# Patient Record
Sex: FEMALE | Race: WHITE | NOT HISPANIC OR LATINO | Employment: FULL TIME | URBAN - METROPOLITAN AREA
[De-identification: names, ages, dates, MRNs, and addresses within clinical notes are randomized per-mention and may not be internally consistent; named-entity substitution may affect disease eponyms.]

---

## 2018-01-17 ENCOUNTER — CONVERSION ENCOUNTER (OUTPATIENT)
Dept: GENERAL RADIOLOGY | Facility: HOSPITAL | Age: 43
End: 2018-01-17

## 2018-01-19 ENCOUNTER — CONVERSION ENCOUNTER (OUTPATIENT)
Dept: MAMMOGRAPHY | Facility: HOSPITAL | Age: 43
End: 2018-01-19

## 2018-10-26 ENCOUNTER — OFFICE VISIT CONVERTED (OUTPATIENT)
Dept: FAMILY MEDICINE CLINIC | Facility: CLINIC | Age: 43
End: 2018-10-26
Attending: PHYSICIAN ASSISTANT

## 2018-11-01 ENCOUNTER — CONVERSION ENCOUNTER (OUTPATIENT)
Dept: MAMMOGRAPHY | Facility: HOSPITAL | Age: 43
End: 2018-11-01

## 2019-01-09 ENCOUNTER — OFFICE VISIT CONVERTED (OUTPATIENT)
Dept: NEUROLOGY | Facility: CLINIC | Age: 44
End: 2019-01-09
Attending: PSYCHIATRY & NEUROLOGY

## 2019-01-11 ENCOUNTER — HOSPITAL ENCOUNTER (OUTPATIENT)
Dept: OTHER | Facility: HOSPITAL | Age: 44
Discharge: HOME | End: 2019-01-11

## 2019-01-11 LAB
ANION GAP SERPL CALC-SCNC: 16 MMOL/L (ref 8–19)
BUN SERPL-MCNC: 12 MG/DL (ref 5–25)
BUN/CREAT SERPL: 11 {RATIO} (ref 6–20)
CALCIUM SERPL-MCNC: 9.2 MG/DL (ref 8.7–10.4)
CHLORIDE SERPL-SCNC: 106 MMOL/L (ref 99–111)
CONV CO2: 24 MMOL/L (ref 22–32)
CREAT UR-MCNC: 1.06 MG/DL (ref 0.5–0.9)
CRP SERPL-MCNC: 1.9 MG/L (ref 0–5)
ERYTHROCYTE [SEDIMENTATION RATE] IN BLOOD: 2 MM/H (ref 0–20)
GFR SERPLBLD BASED ON 1.73 SQ M-ARVRAT: >60 ML/MIN/{1.73_M2}
GLUCOSE SERPL-MCNC: 112 MG/DL (ref 65–99)
OSMOLALITY SERPL CALC.SUM OF ELEC: 295 MOSM/KG (ref 273–304)
POTASSIUM SERPL-SCNC: 3.7 MMOL/L (ref 3.5–5.3)
SODIUM SERPL-SCNC: 142 MMOL/L (ref 135–147)

## 2019-01-18 ENCOUNTER — HOSPITAL ENCOUNTER (OUTPATIENT)
Dept: GENERAL RADIOLOGY | Facility: HOSPITAL | Age: 44
Discharge: HOME | End: 2019-01-18

## 2019-01-22 ENCOUNTER — OFFICE VISIT CONVERTED (OUTPATIENT)
Dept: FAMILY MEDICINE CLINIC | Facility: CLINIC | Age: 44
End: 2019-01-22
Attending: PHYSICIAN ASSISTANT

## 2019-01-23 ENCOUNTER — HOSPITAL ENCOUNTER (OUTPATIENT)
Dept: GENERAL RADIOLOGY | Facility: HOSPITAL | Age: 44
Discharge: HOME | End: 2019-01-23
Attending: NURSE PRACTITIONER

## 2019-04-23 ENCOUNTER — CONVERSION ENCOUNTER (OUTPATIENT)
Dept: FAMILY MEDICINE CLINIC | Facility: CLINIC | Age: 44
End: 2019-04-23

## 2019-04-23 ENCOUNTER — OFFICE VISIT CONVERTED (OUTPATIENT)
Dept: FAMILY MEDICINE CLINIC | Facility: CLINIC | Age: 44
End: 2019-04-23
Attending: PHYSICIAN ASSISTANT

## 2019-05-29 ENCOUNTER — OFFICE VISIT CONVERTED (OUTPATIENT)
Dept: NEUROLOGY | Facility: CLINIC | Age: 44
End: 2019-05-29
Attending: PSYCHIATRY & NEUROLOGY

## 2019-06-04 ENCOUNTER — HOSPITAL ENCOUNTER (OUTPATIENT)
Dept: CARDIOLOGY | Facility: HOSPITAL | Age: 44
Discharge: HOME | End: 2019-06-04

## 2019-12-12 ENCOUNTER — OFFICE VISIT CONVERTED (OUTPATIENT)
Dept: FAMILY MEDICINE CLINIC | Facility: CLINIC | Age: 44
End: 2019-12-12
Attending: PHYSICIAN ASSISTANT

## 2020-01-29 ENCOUNTER — HOSPITAL ENCOUNTER (OUTPATIENT)
Dept: GENERAL RADIOLOGY | Facility: HOSPITAL | Age: 45
Discharge: HOME | End: 2020-01-29
Attending: PHYSICIAN ASSISTANT

## 2020-01-29 ENCOUNTER — HOSPITAL ENCOUNTER (OUTPATIENT)
Dept: LAB | Facility: HOSPITAL | Age: 45
Discharge: HOME | End: 2020-01-29

## 2020-05-08 ENCOUNTER — HOSPITAL ENCOUNTER (OUTPATIENT)
Dept: OTHER | Facility: HOSPITAL | Age: 45
Discharge: HOME | End: 2020-05-08
Attending: NURSE PRACTITIONER

## 2020-05-08 LAB
ALBUMIN SERPL-MCNC: 4.3 G/DL (ref 3.5–5)
ALBUMIN/GLOB SERPL: 1.5 {RATIO} (ref 1.4–2.6)
ALP SERPL-CCNC: 76 U/L (ref 42–98)
ALT SERPL-CCNC: 10 U/L (ref 10–40)
ANION GAP SERPL CALC-SCNC: 17 MMOL/L (ref 8–19)
AST SERPL-CCNC: 14 U/L (ref 15–50)
BASOPHILS # BLD AUTO: 0.05 10*3/UL (ref 0–0.2)
BASOPHILS NFR BLD AUTO: 0.8 % (ref 0–3)
BILIRUB SERPL-MCNC: 0.42 MG/DL (ref 0.2–1.3)
BUN SERPL-MCNC: 11 MG/DL (ref 5–25)
BUN/CREAT SERPL: 10 {RATIO} (ref 6–20)
CALCIUM SERPL-MCNC: 9.1 MG/DL (ref 8.7–10.4)
CHLORIDE SERPL-SCNC: 105 MMOL/L (ref 99–111)
CONV ABS IMM GRAN: 0.03 10*3/UL (ref 0–0.2)
CONV CO2: 22 MMOL/L (ref 22–32)
CONV IMMATURE GRAN: 0.5 % (ref 0–1.8)
CONV TOTAL PROTEIN: 7.2 G/DL (ref 6.3–8.2)
CREAT UR-MCNC: 1.07 MG/DL (ref 0.5–0.9)
DEPRECATED RDW RBC AUTO: 46.5 FL (ref 36.4–46.3)
EOSINOPHIL # BLD AUTO: 0.49 10*3/UL (ref 0–0.7)
EOSINOPHIL # BLD AUTO: 8.3 % (ref 0–7)
ERYTHROCYTE [DISTWIDTH] IN BLOOD BY AUTOMATED COUNT: 12.5 % (ref 11.7–14.4)
EST. AVERAGE GLUCOSE BLD GHB EST-MCNC: 103 MG/DL
GFR SERPLBLD BASED ON 1.73 SQ M-ARVRAT: >60 ML/MIN/{1.73_M2}
GLOBULIN UR ELPH-MCNC: 2.9 G/DL (ref 2–3.5)
GLUCOSE SERPL-MCNC: 92 MG/DL (ref 65–99)
HBA1C MFR BLD: 5.2 % (ref 3.5–5.7)
HCT VFR BLD AUTO: 47.6 % (ref 37–47)
HGB BLD-MCNC: 15.2 G/DL (ref 12–16)
LYMPHOCYTES # BLD AUTO: 1.83 10*3/UL (ref 1–5)
LYMPHOCYTES NFR BLD AUTO: 30.9 % (ref 20–45)
MCH RBC QN AUTO: 32 PG (ref 27–31)
MCHC RBC AUTO-ENTMCNC: 31.9 G/DL (ref 33–37)
MCV RBC AUTO: 100.2 FL (ref 81–99)
MONOCYTES # BLD AUTO: 0.65 10*3/UL (ref 0.2–1.2)
MONOCYTES NFR BLD AUTO: 11 % (ref 3–10)
NEUTROPHILS # BLD AUTO: 2.87 10*3/UL (ref 2–8)
NEUTROPHILS NFR BLD AUTO: 48.5 % (ref 30–85)
NRBC CBCN: 0 % (ref 0–0.7)
OSMOLALITY SERPL CALC.SUM OF ELEC: 289 MOSM/KG (ref 273–304)
PLATELET # BLD AUTO: 260 10*3/UL (ref 130–400)
PMV BLD AUTO: 9.9 FL (ref 9.4–12.3)
POTASSIUM SERPL-SCNC: 3.8 MMOL/L (ref 3.5–5.3)
RBC # BLD AUTO: 4.75 10*6/UL (ref 4.2–5.4)
SODIUM SERPL-SCNC: 140 MMOL/L (ref 135–147)
T4 FREE SERPL-MCNC: 1.3 NG/DL (ref 0.9–1.8)
TSH SERPL-ACNC: 3.26 M[IU]/L (ref 0.27–4.2)
WBC # BLD AUTO: 5.92 10*3/UL (ref 4.8–10.8)

## 2020-05-09 LAB
25(OH)D3 SERPL-MCNC: 18 NG/ML (ref 30–100)
IRON SATN MFR SERPL: 49 % (ref 20–55)
IRON SERPL-MCNC: 137 UG/DL (ref 60–170)
TIBC SERPL-MCNC: 277 UG/DL (ref 245–450)
TRANSFERRIN SERPL-MCNC: 194 MG/DL (ref 250–380)

## 2020-05-21 ENCOUNTER — HOSPITAL ENCOUNTER (OUTPATIENT)
Dept: FAMILY MEDICINE CLINIC | Facility: CLINIC | Age: 45
Discharge: HOME | End: 2020-05-21
Attending: PHYSICIAN ASSISTANT

## 2020-05-21 LAB
FOLATE SERPL-MCNC: 9.7 NG/ML (ref 4.8–20)
VIT B12 SERPL-MCNC: 314 PG/ML (ref 211–911)

## 2020-07-28 ENCOUNTER — TELEMEDICINE CONVERTED (OUTPATIENT)
Dept: FAMILY MEDICINE CLINIC | Facility: CLINIC | Age: 45
End: 2020-07-28
Attending: PHYSICIAN ASSISTANT

## 2020-10-26 ENCOUNTER — HOSPITAL ENCOUNTER (OUTPATIENT)
Dept: OTHER | Facility: HOSPITAL | Age: 45
Discharge: HOME | End: 2020-10-26
Attending: NURSE PRACTITIONER

## 2020-10-26 LAB
ALBUMIN SERPL-MCNC: 4.2 G/DL (ref 3.5–5)
ALP SERPL-CCNC: 99 U/L (ref 42–98)
ALT SERPL-CCNC: 70 U/L (ref 10–40)
AMYLASE SERPL-CCNC: 56 U/L (ref 30–110)
AST SERPL-CCNC: 119 U/L (ref 15–50)
BASOPHILS # BLD AUTO: 0.05 10*3/UL (ref 0–0.2)
BASOPHILS NFR BLD AUTO: 0.6 % (ref 0–3)
BILIRUB SERPL-MCNC: 0.7 MG/DL (ref 0.2–1.3)
CONV ABS IMM GRAN: 0.02 10*3/UL (ref 0–0.2)
CONV BILI, CONJUGATED: <0.2 MG/DL (ref 0–0.6)
CONV IMMATURE GRAN: 0.3 % (ref 0–1.8)
CONV TOTAL PROTEIN: 7 G/DL (ref 6.3–8.2)
CONV UNCONJUGATED BILIRUBIN: 0.5 MG/DL (ref 0–1.1)
DEPRECATED RDW RBC AUTO: 43.2 FL (ref 36.4–46.3)
EOSINOPHIL # BLD AUTO: 0.71 10*3/UL (ref 0–0.7)
EOSINOPHIL # BLD AUTO: 9.1 % (ref 0–7)
ERYTHROCYTE [DISTWIDTH] IN BLOOD BY AUTOMATED COUNT: 11.9 % (ref 11.7–14.4)
HCT VFR BLD AUTO: 49.7 % (ref 37–47)
HGB BLD-MCNC: 16.3 G/DL (ref 12–16)
LIPASE SERPL-CCNC: 31 U/L (ref 5–51)
LYMPHOCYTES # BLD AUTO: 1.73 10*3/UL (ref 1–5)
LYMPHOCYTES NFR BLD AUTO: 22.2 % (ref 20–45)
MCH RBC QN AUTO: 32 PG (ref 27–31)
MCHC RBC AUTO-ENTMCNC: 32.8 G/DL (ref 33–37)
MCV RBC AUTO: 97.6 FL (ref 81–99)
MONOCYTES # BLD AUTO: 0.66 10*3/UL (ref 0.2–1.2)
MONOCYTES NFR BLD AUTO: 8.5 % (ref 3–10)
NEUTROPHILS # BLD AUTO: 4.62 10*3/UL (ref 2–8)
NEUTROPHILS NFR BLD AUTO: 59.3 % (ref 30–85)
NRBC CBCN: 0 % (ref 0–0.7)
PLATELET # BLD AUTO: 276 10*3/UL (ref 130–400)
PMV BLD AUTO: 9.6 FL (ref 9.4–12.3)
RBC # BLD AUTO: 5.09 10*6/UL (ref 4.2–5.4)
T4 FREE SERPL-MCNC: 1.1 NG/DL (ref 0.9–1.8)
TSH SERPL-ACNC: 3.12 M[IU]/L (ref 0.27–4.2)
WBC # BLD AUTO: 7.79 10*3/UL (ref 4.8–10.8)

## 2020-12-29 ENCOUNTER — HOSPITAL ENCOUNTER (OUTPATIENT)
Dept: OTHER | Facility: HOSPITAL | Age: 45
Discharge: HOME | End: 2020-12-29
Attending: INTERNAL MEDICINE

## 2021-01-22 ENCOUNTER — HOSPITAL ENCOUNTER (OUTPATIENT)
Dept: OTHER | Facility: HOSPITAL | Age: 46
Discharge: HOME | End: 2021-01-22
Attending: INTERNAL MEDICINE

## 2021-03-08 ENCOUNTER — TELEMEDICINE CONVERTED (OUTPATIENT)
Dept: FAMILY MEDICINE CLINIC | Facility: CLINIC | Age: 46
End: 2021-03-08
Attending: PHYSICIAN ASSISTANT

## 2021-03-24 ENCOUNTER — HOSPITAL ENCOUNTER (OUTPATIENT)
Dept: OTHER | Facility: HOSPITAL | Age: 46
Discharge: HOME | End: 2021-03-24
Attending: OBSTETRICS & GYNECOLOGY

## 2021-04-14 ENCOUNTER — HOSPITAL ENCOUNTER (OUTPATIENT)
Dept: GENERAL RADIOLOGY | Facility: HOSPITAL | Age: 46
Discharge: HOME | End: 2021-04-14
Attending: NURSE PRACTITIONER

## 2021-05-13 NOTE — PROGRESS NOTES
Progress Note      Patient Name: Susan Bustos   Patient ID: [id number]   Sex: Female   YOB: 1975    Primary Care Provider: Ashu Thomas PA-C   Referring Provider: Ashu Thomas PA-C    Visit Date: July 28, 2020    Provider: Ashu Thomas PA-C   Location: [facility]   Location Address: [address] [place] [address], [address]  [place], [place]  [id number]   Location Phone: [phone]          Chief Complaint  · routine follow up on medication      History Of Present Illness  Video Conferencing Visit  Susan Bustos is a 45 year old /White female who is presenting for evaluation via video conferencing via [employer]. Verbal consent obtained before beginning visit.   The following staff were present during this visit: Lily Alonso CMA/Ashu Thomas PA-C   Susan Bustos is a 45 year old /White female who presents for evaluation and treatment of: routine follow up on medications.      pt presents today for routine follow up on medications.    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 --    Fibromyalgia 10/26/2018 --    Headache 01/23/2019 --    Insomnia, unspecified 10/26/2018 --    Kidney disease --  --    Migraines --  --    Osteoporosis 12/12/2019 --    Osteoporosis --  --    Thyroid disease --  --    Vitamin D deficiency 12/12/2019 --          Past Surgical History  Procedure Name Date Notes   Cholecystecomy --  --    Cystoscopy --  --    Hysterectomy 2010 --    Joint Surgery --  --    Kidney Surgery --  --    Lithotripsy --  --          Medication List  Name Date Started Instructions   Ambien 10 mg oral tablet 07/14/2020 take 1 tablet (10 mg) by oral route once daily at bedtime for 90 days   Ativan 1 mg oral tablet  07/16/2020 take 1 tablet by oral route 3 times a day as needed anxiety for 30 days   Boniva 150 mg oral tablet 06/01/2020 take 1 tablet (150 mg) by oral route once a month on the same date; Take with a full glass of water and remain in an upright position for at least 60 minutes. for 30 days   estradiol 0.1 mg/24 hr transdermal patch semiweekly 12/12/2019 apply 1 patch by transdermal route twice weekly for 90 days   Synthroid 25 mcg oral tablet 01/20/2020 take 1 tablet (25 mcg) by oral route once daily for 90 days   Topamax 50 mg oral tablet 12/12/2019 take 1 tablet (50 mg) by oral route 2 times per day for 90 days   Ultram 50 mg oral tablet 07/14/2020 take 1 tablet (50 mg) by oral route every 8 hours as needed   Vitamin D2 1,250 mcg (50,000 unit) oral capsule 05/18/2020 take 1 capsule (50,000 unit) by oral route once weekly for 90 days         Allergy List  Allergen Name Date Reaction Notes   NO KNOWN DRUG ALLERGIES --  --  --          Family Medical History  Disease Name Relative/Age Notes   *Non Contributory  --    Family history of Arthritis Mother/   Mother   Family history of cancer Daughter/   Daughter   Family history of stroke Father/   Father   Family history of heart disease Father/   Father   Family history of diabetes mellitus Father/   Father   Family history of osteoporosis Mother/   Mother         Social History  Finding Status Start/Stop Quantity Notes   Alcohol Current some day --/-- --  05/29/2019 - rarely drinks   Denies substance abuse Never --/-- --  05/29/2019 -     --  --/-- --  --    Tobacco Never --/-- --  never smoker   Working --  --/-- --  --          Review of Systems  · Constitutional  o Denies  o : fever, fatigue, weight loss, weight gain  · Cardiovascular  o Denies  o : lower extremity edema, claudication, chest pressure, palpitations  · Respiratory  o Denies  o : shortness of breath, wheezing, cough, hemoptysis, dyspnea on exertion  · Gastrointestinal  o Denies  o : nausea,  vomiting, diarrhea, constipation, abdominal pain      Physical Examination  · Constitutional  o Appearance  o : well-nourished, no acute distress  · Respiratory  o Respiratory Effort  o : breathing comfortably, no cough  · Skin and Subcutaneous Tissue  o General Inspection  o : no visible rash, no lesions  · Neurologic  o Mental Status Examination  o :   § Orientation  § : grossly oriented to person, place and time, no facial droop     using mask for covid           Assessment  · Anxiety disorder     300.00/F41.9  · Insomnia, unspecified     780.52/G47.00  · Osteoporosis     733.00/M81.0  · Vitamin D deficiency     268.9/E55.9  · 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  · Fibromyalgia     729.1/M79.7      Plan  · Orders  o MAYURI Report (KASPR) - - 07/28/2020  o ACO-39: Current medications updated and reviewed () - - 07/28/2020  o ACO-14: Influenza immunization administered or previously received () - - 07/28/2020  · Medications  o Medications have been Reconciled  o Transition of Care or Provider Policy  · Instructions  o Avoid any electronic use for at least 30 minutes prior to bed time. Cell phone screens, tablets and TVs imitate daylight, so your brain can become confused on the time of day. No caffeine use in the late afternoon and evenings.  o Obtained a written consent for MAYURI query. Discussed the risk and benefits of the use of controlled substances with the patient, including the risk of tolerance and drug dependence. The patient has been counseled on the need to have an exit strategy, including potentially discontinuing the use of controlled substances. MAYURI has or will be reviewed as soon as it becomes avaliable.  o Take all medications as prescribed/directed.  o Patient instructed/educated on their diet and exercise program.  o Patient was educated/instructed on their diagnosis, treatment and medications prior to discharge from the clinic today.  o Patient counseled to reduce calorie  intake.  o Patient was instructed to exercise regularly.  o Discussed Covid-19 precautions including, but not limited to, social distancing, avoid touching your face, and hand washing.   · Disposition  o Call or Return if symptoms worsen or persist.  o F/U in 4-6 months  o Care Transition            Electronically Signed by: Ashu Thomas PA-C -Author on July 29, 2020 09:23:58 PM

## 2021-05-14 NOTE — PROGRESS NOTES
Progress Note      Patient Name: Susan Bustos   Patient ID: [id number]   Sex: Female   YOB: 1975    Primary Care Provider: Ashu Thomas PA-C   Referring Provider: Ashu Thomas PA-C    Visit Date: March 8, 2021    Provider: Ashu Thomas PA-C   Location: [facility]   Location Address: [address] [place] [address], [address]  [place], [place]  [id number]   Location Phone: [phone]          Chief Complaint  · follow up on medications      History Of Present Illness  Video Conferencing Visit  Susan Bustos is a 46 year old /White female who is presenting for evaluation via video conferencing via [employer]. Verbal consent obtained before beginning visit.   The following staff were present during this visit: Lily Alonso CMA/Ashu Thomas PA-C   Susan Bustos is a 46 year old /White female who presents for evaluation and treatment of: follow up on medications.      pt presents today for follow up on medications.    pt states no new issues or concerns.    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 --    Fibromyalgia 10/26/2018 --    Headache 01/23/2019 --    Insomnia, unspecified 10/26/2018 --    Kidney disease --  --    Migraines --  --    Osteoporosis 12/12/2019 --    Osteoporosis --  --    Thyroid disease --  --    Vitamin D deficiency 12/12/2019 --          Past Surgical History  Procedure Name Date Notes   Cholecystecomy --  --    Cystoscopy --  --    Hysterectomy 2010 --    Joint Surgery --  --    Kidney Surgery --  --    Lithotripsy --  --          Medication List  Name Date Started Instructions   Ambien 10 mg oral  tablet 01/04/2021 take 1 tablet (10 mg) by oral route once daily at bedtime for 90 days   Ativan 1 mg oral tablet 02/18/2021 take 1 tablet by oral route 3 times a day as needed anxiety for 30 days   estradiol 0.1 mg/24 hr transdermal patch semiweekly 12/12/2019 apply 1 patch by transdermal route twice weekly for 90 days   Synthroid 25 mcg oral tablet 01/03/2021 TAKE 1 TABLET DAILY   Topamax 50 mg oral tablet 01/04/2021 take 1 tablet (50 mg) by oral route 2 times per day for 90 days   Ultram 50 mg oral tablet 01/04/2021 take 1 tablet (50 mg) by oral route every 8 hours as needed   Vitamin D2 1,250 mcg (50,000 unit) oral capsule 05/18/2020 take 1 capsule (50,000 unit) by oral route once weekly for 90 days         Allergy List  Allergen Name Date Reaction Notes   NO KNOWN DRUG ALLERGIES --  --  --        Allergies Reconciled  Family Medical History  Disease Name Relative/Age Notes   *Non Contributory  --    Family history of Arthritis Mother/   Mother   Family history of cancer Daughter/   Daughter   Family history of stroke Father/   Father   Family history of heart disease Father/   Father   Family history of diabetes mellitus Father/   Father   Family history of osteoporosis Mother/   Mother         Social History  Finding Status Start/Stop Quantity Notes   Alcohol Current some day --/-- --  05/29/2019 - rarely drinks   Denies substance abuse Never --/-- --  05/29/2019 -     --  --/-- --  --    Tobacco Never --/-- --  never smoker   Working --  --/-- --  --          Review of Systems  · Constitutional  o Denies  o : fever, fatigue, weight loss, weight gain  · Cardiovascular  o Denies  o : lower extremity edema, claudication, chest pressure, palpitations  · Respiratory  o Denies  o : shortness of breath, wheezing, cough, hemoptysis, dyspnea on exertion  · Gastrointestinal  o Denies  o : nausea, vomiting, diarrhea, constipation, abdominal pain      Physical Examination  · Constitutional  o Appearance  o :  well-nourished, no acute distress  · Head and Face  o Head  o :   § Inspection  § : atraumatic, normocephalic  · Respiratory  o Respiratory Effort  o : breathing comfortably, no cough  · Skin and Subcutaneous Tissue  o General Inspection  o : no visible rash, no lesions  · Neurologic  o Mental Status Examination  o :   § Orientation  § : grossly oriented to person, place and time, no facial droop          Assessment  · Anxiety disorder     300.00/F41.9  · Hypothyroidism     244.9/E03.9  · Insomnia, unspecified     780.52/G47.00  · Vitamin D deficiency     268.9/E55.9  · Need for influenza vaccination     V04.81/Z23  · 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  · Fibromyalgia     729.1/M79.7      Plan  · Orders  o ACO-14: Influenza immunization was not administered for reasons documented () - V04.81/Z23 - 03/08/2021   rcvd 10/20  o Free T4 (54710) - - 03/08/2021  o Physical, Primary Care Panel (CBC, CMP, Lipid, TSH) [facility] (55546, 51772, 45160, 12898) - - 03/08/2021  o Urinalysis with Reflex Microscopy ([facility]) (66532) - - 03/08/2021  o Vitamin D (25-Hydroxy) Level (88107) - - 03/08/2021  o MAYURI Report (KASPR) - - 03/08/2021  o ACO-39: Current medications updated and reviewed (1159F, ) - - 03/08/2021  o Urine Drug Screen ([facility]) (49323) - - 03/08/2021  · Medications  o Medications have been Reconciled  o Transition of Care or Provider Policy  · Instructions  o Avoid any electronic use for at least 30 minutes prior to bed time. Cell phone screens, tablets and TVs imitate daylight, so your brain can become confused on the time of day. No caffeine use in the late afternoon and evenings.  o Obtained a written consent for MAYURI query. Discussed the risk and benefits of the use of controlled substances with the patient, including the risk of tolerance and drug dependence. The patient has been counseled on the need to have an exit strategy, including potentially discontinuing the use of controlled substances. MAYURI has or  will be reviewed as soon as it becomes avaliable.  o See note for indication of controlled substance. Sukhdeep and drug screen have been reviewed. Controlled substance agreement signed and scanned into chart. After discussion of risks and benefits of medication, I have determined patient is suitable for Rx while demonstrating the ability to safely follow and administer the medication plan. Patient understands the expectation that medication directions cannot be adjusted without a provider's written approval.   o Take all medications as prescribed/directed.  o Patient instructed/educated on their diet and exercise program.  o Patient was educated/instructed on their diagnosis, treatment and medications prior to discharge from the clinic today.  o Discussed Covid-19 precautions including, but not limited to, social distancing, avoid touching your face, and hand washing.   · Disposition  o Call or Return if symptoms worsen or persist.  o F/U in 6 months  o Care Transition            Electronically Signed by: Ashu Thomas PA-C -Author on March 8, 2021 08:03:34 PM

## 2021-05-15 VITALS
DIASTOLIC BLOOD PRESSURE: 70 MMHG | SYSTOLIC BLOOD PRESSURE: 122 MMHG | HEART RATE: 70 BPM | BODY MASS INDEX: 22.28 KG/M2 | HEIGHT: 60 IN | WEIGHT: 113.5 LBS | OXYGEN SATURATION: 98 %

## 2021-05-15 VITALS
HEIGHT: 60 IN | WEIGHT: 116 LBS | SYSTOLIC BLOOD PRESSURE: 125 MMHG | DIASTOLIC BLOOD PRESSURE: 75 MMHG | BODY MASS INDEX: 22.78 KG/M2 | HEART RATE: 75 BPM

## 2021-05-15 VITALS
WEIGHT: 119.5 LBS | HEIGHT: 60 IN | BODY MASS INDEX: 23.46 KG/M2 | HEART RATE: 96 BPM | SYSTOLIC BLOOD PRESSURE: 122 MMHG | RESPIRATION RATE: 20 BRPM | OXYGEN SATURATION: 96 % | DIASTOLIC BLOOD PRESSURE: 73 MMHG

## 2021-05-16 VITALS
BODY MASS INDEX: 23.85 KG/M2 | OXYGEN SATURATION: 98 % | DIASTOLIC BLOOD PRESSURE: 79 MMHG | HEIGHT: 60 IN | SYSTOLIC BLOOD PRESSURE: 122 MMHG | WEIGHT: 121.5 LBS | HEART RATE: 95 BPM

## 2021-05-16 VITALS
WEIGHT: 122.19 LBS | DIASTOLIC BLOOD PRESSURE: 79 MMHG | BODY MASS INDEX: 23.99 KG/M2 | HEIGHT: 60 IN | HEART RATE: 94 BPM | SYSTOLIC BLOOD PRESSURE: 133 MMHG

## 2021-05-16 VITALS
DIASTOLIC BLOOD PRESSURE: 85 MMHG | SYSTOLIC BLOOD PRESSURE: 135 MMHG | RESPIRATION RATE: 18 BRPM | HEART RATE: 94 BPM | WEIGHT: 121 LBS | BODY MASS INDEX: 23.75 KG/M2 | OXYGEN SATURATION: 99 % | HEIGHT: 60 IN

## 2021-06-14 DIAGNOSIS — F41.9 ANXIETY DISORDER, UNSPECIFIED TYPE: Primary | ICD-10-CM

## 2021-06-14 PROBLEM — E55.9 VITAMIN D DEFICIENCY: Status: ACTIVE | Noted: 2019-12-12

## 2021-06-14 PROBLEM — G43.909 MIGRAINES: Status: ACTIVE | Noted: 2021-06-14

## 2021-06-14 PROBLEM — D68.2 FACTOR II DEFICIENCY: Status: ACTIVE | Noted: 2019-12-12

## 2021-06-14 PROBLEM — E03.9 HYPOTHYROIDISM: Status: ACTIVE | Noted: 2021-03-08

## 2021-06-14 PROBLEM — G47.00 INSOMNIA, UNSPECIFIED: Status: ACTIVE | Noted: 2018-10-26

## 2021-06-14 PROBLEM — M81.0 OSTEOPOROSIS: Status: ACTIVE | Noted: 2019-12-12

## 2021-06-14 PROBLEM — M79.7 FIBROMYALGIA: Status: ACTIVE | Noted: 2018-10-26

## 2021-06-14 PROBLEM — N28.9 KIDNEY DISEASE: Status: ACTIVE | Noted: 2021-06-14

## 2021-06-14 RX ORDER — LORAZEPAM 1 MG/1
TABLET ORAL
COMMUNITY
Start: 2021-05-18 | End: 2021-09-09

## 2021-06-14 RX ORDER — LORAZEPAM 1 MG/1
1 TABLET ORAL EVERY 8 HOURS PRN
Qty: 90 TABLET | Refills: 0 | Status: SHIPPED | OUTPATIENT
Start: 2021-06-14 | End: 2021-07-20

## 2021-06-14 NOTE — TELEPHONE ENCOUNTER
Pt requesting rf on  Lorazepam 1mg 1 tab po tid #90 0rf    PER GW    Last fill-5/18/21  OV-3/8/21  Sukhdeep-6/14/21  UDS-1/22/19, LM advising Pt to come in for UDS

## 2021-07-19 DIAGNOSIS — F41.9 ANXIETY DISORDER, UNSPECIFIED TYPE: ICD-10-CM

## 2021-07-20 RX ORDER — LORAZEPAM 1 MG/1
1 TABLET ORAL EVERY 8 HOURS PRN
Qty: 90 TABLET | Refills: 0 | Status: SHIPPED | OUTPATIENT
Start: 2021-07-20 | End: 2021-08-24

## 2021-07-23 DIAGNOSIS — G47.00 INSOMNIA, UNSPECIFIED TYPE: Primary | ICD-10-CM

## 2021-07-23 RX ORDER — ZOLPIDEM TARTRATE 10 MG/1
TABLET ORAL
Qty: 90 TABLET | Refills: 1 | Status: SHIPPED | OUTPATIENT
Start: 2021-07-23 | End: 2022-01-14

## 2021-07-23 NOTE — TELEPHONE ENCOUNTER
Pt requesting rf on  Ambien 10mg 1 tab po hs #90 1rf    Last fill-1/14/21  OV-3/8/21  Sukhdeep-6/14/21  UDS-1/22/19, pt advised we will need UDS

## 2021-07-31 DIAGNOSIS — M79.7 FIBROMYALGIA: Primary | ICD-10-CM

## 2021-08-02 RX ORDER — TRAMADOL HYDROCHLORIDE 50 MG/1
TABLET ORAL
Qty: 270 TABLET | Refills: 1 | Status: SHIPPED | OUTPATIENT
Start: 2021-08-02 | End: 2022-02-07

## 2021-08-24 DIAGNOSIS — F41.9 ANXIETY DISORDER, UNSPECIFIED TYPE: ICD-10-CM

## 2021-08-24 RX ORDER — LORAZEPAM 1 MG/1
1 TABLET ORAL EVERY 8 HOURS PRN
Qty: 90 TABLET | Refills: 0 | Status: SHIPPED | OUTPATIENT
Start: 2021-08-24 | End: 2021-09-21

## 2021-08-24 NOTE — TELEPHONE ENCOUNTER
Pt requesting rf on ativan 1mg 1 tab po q8hr prn #90 0rf    Last fill-7/23/21  OV-3/8/21  Sukhdeep-6/14/21  UDS-1/22/19, LM advising pt we need UDS to cont filling

## 2021-09-09 ENCOUNTER — TELEMEDICINE (OUTPATIENT)
Dept: FAMILY MEDICINE CLINIC | Facility: CLINIC | Age: 46
End: 2021-09-09

## 2021-09-09 DIAGNOSIS — E03.9 HYPOTHYROIDISM, UNSPECIFIED TYPE: ICD-10-CM

## 2021-09-09 DIAGNOSIS — M81.0 OSTEOPOROSIS, UNSPECIFIED OSTEOPOROSIS TYPE, UNSPECIFIED PATHOLOGICAL FRACTURE PRESENCE: ICD-10-CM

## 2021-09-09 DIAGNOSIS — J32.9 SINUSITIS, UNSPECIFIED CHRONICITY, UNSPECIFIED LOCATION: Primary | ICD-10-CM

## 2021-09-09 PROBLEM — R51.9 HEADACHE: Status: ACTIVE | Noted: 2019-01-23

## 2021-09-09 PROBLEM — K51.90 COLITIS, ULCERATIVE (HCC): Status: ACTIVE | Noted: 2021-09-09

## 2021-09-09 PROCEDURE — 99213 OFFICE O/P EST LOW 20 MIN: CPT | Performed by: PHYSICIAN ASSISTANT

## 2021-09-09 RX ORDER — OMALIZUMAB 202.5 MG/1.4ML
1.2 INJECTION, SOLUTION SUBCUTANEOUS
COMMUNITY
End: 2021-09-09

## 2021-09-09 RX ORDER — TOPIRAMATE 50 MG/1
50 TABLET, FILM COATED ORAL 2 TIMES DAILY
COMMUNITY
Start: 2021-06-30 | End: 2021-12-21

## 2021-09-09 RX ORDER — LEVOTHYROXINE SODIUM 25 MCG
TABLET ORAL
COMMUNITY
Start: 2021-06-29 | End: 2021-12-20

## 2021-09-09 RX ORDER — AMITRIPTYLINE HYDROCHLORIDE 10 MG/1
TABLET, FILM COATED ORAL
COMMUNITY
End: 2021-09-09

## 2021-09-09 RX ORDER — UBROGEPANT 100 MG/1
100 TABLET ORAL SEE ADMIN INSTRUCTIONS
COMMUNITY
Start: 2021-08-25 | End: 2021-09-16

## 2021-09-09 RX ORDER — RANITIDINE 300 MG/1
TABLET ORAL
COMMUNITY
End: 2021-09-09

## 2021-09-09 RX ORDER — ESTRADIOL 0.05 MG/D
1 FILM, EXTENDED RELEASE TRANSDERMAL 2 TIMES WEEKLY
COMMUNITY
Start: 2021-07-01 | End: 2021-09-13

## 2021-09-09 NOTE — PROGRESS NOTES
"Chief Complaint  Headache (6 month follow up), Anxiety, and Hypothyroidism    Subjective          Susan Bustos presents to [facility]  History of Present Illness    Susan Bustos is a 46 y.o. female who presents today for a 6 month follow up HA, Anxiety, Hypothyroidism    Pt offers no complaints at this time, she would like to discuss referral to ENT for reoccurring sinus problems.     Labs-10/2020  Mammo-4/2021  Sukhdeep-6/14/21  UDS-1/2019      Current Outpatient Medications:   •  estradiol (MINIVELLE, VIVELLE-DOT) 0.05 MG/24HR patch, 1 patch 2 (Two) Times a Week., Disp: , Rfl:   •  LORazepam (ATIVAN) 1 MG tablet, TAKE 1 TABLET BY MOUTH EVERY 8 (EIGHT) HOURS AS NEEDED FOR ANXIETY., Disp: 90 tablet, Rfl: 0  •  Synthroid 25 MCG tablet, , Disp: , Rfl:   •  topiramate (TOPAMAX) 50 MG tablet, Take 50 mg by mouth 2 (Two) Times a Day., Disp: , Rfl:   •  traMADol (ULTRAM) 50 MG tablet, TAKE 1 TABLET BY MOUTH EVERY 8 HOURS AS NEEDED, Disp: 270 tablet, Rfl: 1  •  ubrogepant 100 MG tablet, Take 100 mg by mouth See Admin Instructions., Disp: , Rfl:   •  zolpidem (AMBIEN) 10 MG tablet, TAKE 1 TABLET BY MOUTH EVERYDAY AT BEDTIME, Disp: 90 tablet, Rfl: 1    Objective      Vital Signs:   There were no vitals taken for this visit.   Estimated body mass index is 22.17 kg/m² as calculated from the following:    Height as of 12/12/19: 152.4 cm (60\").    Weight as of 12/12/19: 51.5 kg (113 lb 8 oz).     Physical Exam  Constitutional:       Appearance: Normal appearance.   HENT:      Head: Normocephalic.   Skin:     Comments: Normal appearing   Neurological:      Mental Status: She is alert.   Psychiatric:         Mood and Affect: Mood normal.         Behavior: Behavior normal.        Result Review :     Common labs    Common Labsle 10/26/20 10/26/20    1427 1427   Albumin  4.2   Total Bilirubin  0.70   Alkaline Phosphatase  99 (A)   AST (SGOT)  119 (A)   ALT (SGPT)  70 (A)   WBC 7.79    Hemoglobin " 16.3 (A)    Hematocrit 49.7 (A)    Platelets 276    (A) Abnormal value                        Assessment and Plan      Diagnoses and all orders for this visit:    1. Sinusitis, unspecified chronicity, unspecified location (Primary)  -     Ambulatory Referral to ENT (Otolaryngology)    2. Osteoporosis, unspecified osteoporosis type, unspecified pathological fracture presence  -     DEXA Bone Density Axial    3. Hypothyroidism, unspecified type  Overview:  Stable on synthroid 25mcg daily        Follow Up     Return in about 6 months (around 3/9/2022).    I have reviewed information obtained and documented by others and I have confirmed the accuracy of this documented note.     Patient was given instructions and counseling regarding her condition or for health maintenance advice. Please see specific information pulled into the AVS if appropriate.     REYNA Gama

## 2021-09-13 RX ORDER — ESTRADIOL 0.05 MG/D
FILM, EXTENDED RELEASE TRANSDERMAL
Qty: 24 PATCH | Refills: 1 | Status: SHIPPED | OUTPATIENT
Start: 2021-09-13 | End: 2022-02-10

## 2021-09-16 RX ORDER — UBROGEPANT 100 MG/1
TABLET ORAL
Qty: 10 TABLET | Refills: 3 | Status: SHIPPED | OUTPATIENT
Start: 2021-09-16 | End: 2022-02-07

## 2021-09-21 DIAGNOSIS — F41.9 ANXIETY DISORDER, UNSPECIFIED TYPE: ICD-10-CM

## 2021-09-22 ENCOUNTER — OFFICE VISIT (OUTPATIENT)
Dept: OTOLARYNGOLOGY | Facility: CLINIC | Age: 46
End: 2021-09-22

## 2021-09-22 VITALS — WEIGHT: 128.2 LBS | BODY MASS INDEX: 25.17 KG/M2 | HEIGHT: 60 IN | TEMPERATURE: 97.7 F

## 2021-09-22 DIAGNOSIS — J32.0 CHRONIC MAXILLARY SINUSITIS: Primary | ICD-10-CM

## 2021-09-22 PROCEDURE — 99203 OFFICE O/P NEW LOW 30 MIN: CPT | Performed by: OTOLARYNGOLOGY

## 2021-09-22 RX ORDER — LORAZEPAM 1 MG/1
1 TABLET ORAL EVERY 8 HOURS PRN
Qty: 90 TABLET | Refills: 0 | Status: SHIPPED | OUTPATIENT
Start: 2021-09-22 | End: 2021-10-25

## 2021-09-22 RX ORDER — ASPIRIN 81 MG/1
81 TABLET, CHEWABLE ORAL DAILY
COMMUNITY

## 2021-09-22 RX ORDER — AMOXICILLIN AND CLAVULANATE POTASSIUM 875; 125 MG/1; MG/1
1 TABLET, FILM COATED ORAL EVERY 12 HOURS
Qty: 28 TABLET | Refills: 0 | Status: SHIPPED | OUTPATIENT
Start: 2021-09-22 | End: 2021-10-06

## 2021-09-22 NOTE — PROGRESS NOTES
Patient Name: Susan Bustos   Visit Date: 09/22/2021   Patient ID: [id number]  Provider: Nico Merino MD    Sex: female  Location: [facility]   YOB: 1975  Location Address: [address] [place] [address], [address],?[place]    Primary Care Provider Jarrod Thomas PA  Location Phone: [phone]    Referring Provider: REYNA Gama        Chief Complaint  Sinus Problem    History of Present Illness  Susan Bustos is a 46 y.o. female who presents to [facility] today as a consult from REYNA Gama for evaluation of her sinuses.   She tells me that for years she has had issues with frequent headaches and migraines.  These typically involve her occiput or temples.  There often associated with photophobia and phonophobia.  These seem to occur on a daily basis.  She does report occasional clear rhinorrhea which is mostly postnasal.  She denies any nasal congestion, hyposmia, cough, or epistaxis.  She takes Zyrtec as needed.  CT of the face without contrast on 1/29/2020 revealed mild bilateral maxillary sinus mucosal thickening worse on the left than the right with an air-fluid level on the left as well as very mild sclerotic changes to the left posterior maxillary sinus wall.  These findings were fairly consistent with the ones from her 11/21/2018 CT scan of the face without contrast.    Past Medical History:   Diagnosis Date   • Anxiety disorder 10/28/2018   • Colitis    • Factor II deficiency (CMS/HCC) 12/12/2019   • Fibromyalgia 10/26/2018   • Headache    • Hypothyroidism 03/08/2021   • Insomnia 10/26/2018   • Kidney disease    • Migraine    • Osteoporosis 12/12/2019   • Thyroid disease    • Vitamin D deficiency 12/12/2019       Past Surgical History:   Procedure Laterality Date   • CHOLECYSTECTOMY     • CYSTOSCOPY     • HYSTERECTOMY  2010   • KIDNEY SURGERY     • OTHER SURGICAL HISTORY      JOINT SURGERY   • OTHER SURGICAL HISTORY       "LITHOTRIPSY         Current Outpatient Medications:   •  aspirin 81 MG chewable tablet, Chew 81 mg Daily., Disp: , Rfl:   •  estradiol (MINIVELLE, VIVELLE-DOT) 0.05 MG/24HR patch, APPLY 1 PATCH TWICE WEEKLY, Disp: 24 patch, Rfl: 1  •  hyoscyamine (LEVSIN) 0.125 MG SL tablet, Take 0.125 mg by mouth Every 4 (Four) Hours As Needed for Cramping., Disp: , Rfl:   •  LORazepam (ATIVAN) 1 MG tablet, TAKE 1 TABLET BY MOUTH EVERY 8 (EIGHT) HOURS AS NEEDED FOR ANXIETY., Disp: 90 tablet, Rfl: 0  •  Synthroid 25 MCG tablet, , Disp: , Rfl:   •  topiramate (TOPAMAX) 50 MG tablet, Take 50 mg by mouth 2 (Two) Times a Day., Disp: , Rfl:   •  traMADol (ULTRAM) 50 MG tablet, TAKE 1 TABLET BY MOUTH EVERY 8 HOURS AS NEEDED, Disp: 270 tablet, Rfl: 1  •  ubrogepant 100 MG tablet, TAKE 1 TABLET BY MOUTH AS DIRECTED, Disp: 10 tablet, Rfl: 3  •  zolpidem (AMBIEN) 10 MG tablet, TAKE 1 TABLET BY MOUTH EVERYDAY AT BEDTIME, Disp: 90 tablet, Rfl: 1  •  amoxicillin-clavulanate (AUGMENTIN) 875-125 MG per tablet, Take 1 tablet by mouth Every 12 (Twelve) Hours for 14 days., Disp: 28 tablet, Rfl: 0     No Known Allergies    Social History     Tobacco Use   • Smoking status: Never Smoker   • Smokeless tobacco: Never Used   Vaping Use   • Vaping Use: Never used   Substance Use Topics   • Alcohol use: Yes     Comment: RARELY   • Drug use: Never        Objective     Vital Signs:   Temp 97.7 °F (36.5 °C) (Temporal)   Ht 152.4 cm (60\")   Wt 58.2 kg (128 lb 3.2 oz)   BMI 25.04 kg/m²       Physical Exam    General: Well developed, well nourished patient of stated age in no acute distress. Voice is strong and clear.   Head: Normocephalic and atraumatic.  Face: No lesions.  Bilateral parotid and submandibular glands are unremarkable.  Stensen's and Warthin's ducts are productive of clear saliva bilaterally.  House-Brackmann I/VI     bilaterally.   muscles and temporomandibular joint nontender to palpation.  No TMJ crepitus.  Eyes: PERRLA, sclerae " anicteric, no conjunctival injection. Extra ocular movements are intact and full. No nystagmus.   Ears: Auricles are normal in appearance. Bilateral external auditory canals are unremarkable. Bilateral tympanic membranes are clear and without effusion. Hearing normal to conversational voice.   Nose: External nose is normal in appearance. Bilateral nares are patent with normal appearing mucosa. Septum midline. Turbinates are unremarkable. No lesions.   Oral Cavity: Lips are normal in appearance. Oral mucosa is unremarkable. Gingiva is unremarkable. Normal dentition for age. Tongue is unremarkable with good movement. Hard palate is unremarkable.   Oropharynx: Soft palate is unremarkable with full movement. Uvula is unremarkable. Bilateral tonsils are unremarkable. Posterior oropharynx is unremarkable.    Larynx and hypopharynx: Deferred secondary to gag reflex.  Neck: Supple.  No mass.  Nontender to palpation.  Trachea midline. Thyroid normal size and without nodules to palpation.   Lymphatic: No lymphadenopathy upon palpation.  Respiratory: Clear to auscultation bilaterally, nonlabored respirations    Cardiovascular: RRR, no murmurs, rubs, or gallops,   Psychiatric: Appropriate affect, cooperative   Neurologic: Oriented x 3, strength symmetric in all extremities, Cranial Nerves II-XII are grossly intact to confrontation   Skin: Warm and dry. No rashes.    Procedures           Result Review :               Assessment and Plan    Diagnoses and all orders for this visit:    1. Chronic maxillary sinusitis (Primary)  -     amoxicillin-clavulanate (AUGMENTIN) 875-125 MG per tablet; Take 1 tablet by mouth Every 12 (Twelve) Hours for 14 days.  Dispense: 28 tablet; Refill: 0    Impressions and findings were discussed.  We reviewed the images from her 11/21/2018 and 1/29/2020 CT scan of the sinuses without contrast.  This revealed mild bilateral maxillary sinus mucosal thickening worse on the left than the right with a left  air-fluid level and mild posterior maxillary sinus wall sclerotic change.  Both scans were consistent.  We discussed my concern that this may represent more of an odontogenic source to the chronic rhinosinusitis although she is totally asymptomatic aside from occasional postnasal drainage and frequent headaches.  Options for further evaluation management were discussed and she will be tried on a 2-week course of Augmentin and Flonase.  We discussed potentially placing her on prednisone but oral corticosteroids make her headaches worse.  We discussed that if this seems to be helpful for migraines that it may be worth considering more aggressive management of the sinus disease.  She is also scheduled to undergo dental evaluation early next month and I have asked that she bring this up with her dentist.  There is no obvious sign of periapical disease on her CT scan but the roots extend into the maxillary sinus completely.  She was given ample time to ask questions, all of which were answered to her satisfaction.        Follow Up   No follow-ups on file.  Patient was given instructions and counseling regarding her condition or for health maintenance advice. Please see specific information pulled into the AVS if appropriate.

## 2021-09-23 ENCOUNTER — TELEPHONE (OUTPATIENT)
Dept: OTOLARYNGOLOGY | Facility: CLINIC | Age: 46
End: 2021-09-23

## 2021-09-23 DIAGNOSIS — J32.0 CHRONIC MAXILLARY SINUSITIS: Primary | ICD-10-CM

## 2021-09-23 RX ORDER — FLUTICASONE PROPIONATE 50 MCG
2 SPRAY, SUSPENSION (ML) NASAL DAILY
Qty: 16 G | Refills: 11 | Status: SHIPPED | OUTPATIENT
Start: 2021-09-23 | End: 2022-03-11

## 2021-09-23 RX ORDER — FLUCONAZOLE 150 MG/1
150 TABLET ORAL ONCE
Qty: 1 TABLET | Refills: 2 | Status: SHIPPED | OUTPATIENT
Start: 2021-09-23 | End: 2021-09-23

## 2021-09-23 NOTE — TELEPHONE ENCOUNTER
----- Message from Susan Bustos sent at 9/22/2021  6:51 PM EDT -----  Regarding: Prescription Question  Contact: [phone]  I got the augmentin last night but they said they didn't receive the diflucan or Flonase . Can you please send that to [facility]. Thank you very much.  Susan Bustos, RN

## 2021-10-22 DIAGNOSIS — F41.9 ANXIETY DISORDER, UNSPECIFIED TYPE: ICD-10-CM

## 2021-10-25 RX ORDER — LORAZEPAM 1 MG/1
1 TABLET ORAL EVERY 8 HOURS PRN
Qty: 90 TABLET | Refills: 0 | Status: SHIPPED | OUTPATIENT
Start: 2021-10-25 | End: 2021-12-03

## 2021-10-25 NOTE — TELEPHONE ENCOUNTER
Pt requesting rf on Ativan 1mg 1 tab po q8hr prn #90 0rf    Last fill-9/23/21  OV-9/9/21  Sukhdeep-9/21/21  UDS-1/2019, LM advising pt we need uds to fill

## 2021-10-29 ENCOUNTER — APPOINTMENT (OUTPATIENT)
Dept: BONE DENSITY | Facility: HOSPITAL | Age: 46
End: 2021-10-29

## 2021-10-29 ENCOUNTER — TELEPHONE (OUTPATIENT)
Dept: FAMILY MEDICINE CLINIC | Facility: CLINIC | Age: 46
End: 2021-10-29

## 2021-10-29 ENCOUNTER — HOSPITAL ENCOUNTER (OUTPATIENT)
Dept: BONE DENSITY | Facility: HOSPITAL | Age: 46
Discharge: HOME | End: 2021-10-29
Admitting: PHYSICIAN ASSISTANT

## 2021-10-29 PROCEDURE — 77080 DXA BONE DENSITY AXIAL: CPT

## 2021-10-29 NOTE — TELEPHONE ENCOUNTER
----- Message from REYNA Gama sent at 10/29/2021  9:56 AM EDT -----  Osteoporosis - noted - I suggest prolia.  Pt has been hesitant in the past.

## 2021-12-02 DIAGNOSIS — F41.9 ANXIETY DISORDER, UNSPECIFIED TYPE: ICD-10-CM

## 2021-12-03 RX ORDER — LORAZEPAM 1 MG/1
TABLET ORAL
Qty: 90 TABLET | Refills: 0 | Status: SHIPPED | OUTPATIENT
Start: 2021-12-03 | End: 2022-01-03

## 2021-12-06 ENCOUNTER — TELEPHONE (OUTPATIENT)
Dept: FAMILY MEDICINE CLINIC | Facility: CLINIC | Age: 46
End: 2021-12-06

## 2021-12-06 NOTE — TELEPHONE ENCOUNTER
----- Message from Lalito Sanchez sent at 12/6/2021  8:48 AM EST -----  Regarding: FW: yearly UDS    ----- Message -----  From: Susan Bustos  Sent: 12/3/2021   1:32 PM EST  To: [facility]  Subject: yearly UDS                                       I needed an appt for March with Jarrod for a med follow up do I need to just come by anytime and do the uds before then? Can u check and see if I have one made for March?

## 2021-12-06 NOTE — TELEPHONE ENCOUNTER
CALLED PT AND LET HER KNOW THAT SHE CAN COME IN AT ANY POINT TO DO HER UDS. SCHEDULED HER FOR A FOLLOW UP WITH HOMER ON 3/11/21 @8:45

## 2021-12-06 NOTE — TELEPHONE ENCOUNTER
CALLED PATIENT AND LET HER KNOW THAT SHE COULD COME IN ANY TIME AND DO HER UDS. SCHEDULED PT FOR MARCH 11 @8:45 FOR FOLLOW UP

## 2021-12-17 ENCOUNTER — IMMUNIZATION (OUTPATIENT)
Dept: VACCINE CLINIC | Facility: HOSPITAL | Age: 46
End: 2021-12-17

## 2021-12-17 DIAGNOSIS — Z23 NEED FOR VACCINATION: Primary | ICD-10-CM

## 2021-12-17 PROCEDURE — 0064A HC ADM SARSCOV2 50MCG/0.25ML BOOSTER: CPT | Performed by: INTERNAL MEDICINE

## 2021-12-17 PROCEDURE — 91306 HC SARSCOV2 VAC 50MCG/0.25ML IM: CPT | Performed by: INTERNAL MEDICINE

## 2021-12-20 RX ORDER — LEVOTHYROXINE SODIUM 25 MCG
TABLET ORAL
Qty: 90 TABLET | Refills: 3 | Status: SHIPPED | OUTPATIENT
Start: 2021-12-20 | End: 2022-09-12 | Stop reason: SDUPTHER

## 2021-12-21 DIAGNOSIS — G43.909 MIGRAINE WITHOUT STATUS MIGRAINOSUS, NOT INTRACTABLE, UNSPECIFIED MIGRAINE TYPE: Primary | ICD-10-CM

## 2021-12-21 RX ORDER — TOPIRAMATE 50 MG/1
TABLET, FILM COATED ORAL
Qty: 180 TABLET | Refills: 3 | Status: SHIPPED | OUTPATIENT
Start: 2021-12-21 | End: 2022-03-11

## 2021-12-25 ENCOUNTER — HOSPITAL ENCOUNTER (EMERGENCY)
Facility: HOSPITAL | Age: 46
Discharge: HOME | End: 2021-12-25
Attending: EMERGENCY MEDICINE | Admitting: EMERGENCY MEDICINE

## 2021-12-25 VITALS
HEART RATE: 107 BPM | DIASTOLIC BLOOD PRESSURE: 86 MMHG | RESPIRATION RATE: 18 BRPM | OXYGEN SATURATION: 97 % | TEMPERATURE: 98.1 F | SYSTOLIC BLOOD PRESSURE: 129 MMHG

## 2021-12-25 DIAGNOSIS — S61.012A LACERATION OF LEFT THUMB WITHOUT FOREIGN BODY WITHOUT DAMAGE TO NAIL, INITIAL ENCOUNTER: Primary | ICD-10-CM

## 2021-12-25 PROCEDURE — 25010000002 TETANUS-DIPHTH-ACELL PERTUSSIS 5-2.5-18.5 LF-MCG/0.5 SUSPENSION PREFILLED SYRINGE: Performed by: NURSE PRACTITIONER

## 2021-12-25 PROCEDURE — 90715 TDAP VACCINE 7 YRS/> IM: CPT | Performed by: NURSE PRACTITIONER

## 2021-12-25 PROCEDURE — 90471 IMMUNIZATION ADMIN: CPT | Performed by: NURSE PRACTITIONER

## 2021-12-25 PROCEDURE — 99282 EMERGENCY DEPT VISIT SF MDM: CPT

## 2021-12-25 RX ORDER — GINSENG 100 MG
1 CAPSULE ORAL EVERY 8 HOURS SCHEDULED
Status: DISCONTINUED | OUTPATIENT
Start: 2021-12-25 | End: 2021-12-25 | Stop reason: HOSPADM

## 2021-12-25 RX ORDER — LIDOCAINE HYDROCHLORIDE 10 MG/ML
INJECTION, SOLUTION EPIDURAL; INFILTRATION; INTRACAUDAL; PERINEURAL
Status: DISCONTINUED
Start: 2021-12-25 | End: 2021-12-25 | Stop reason: HOSPADM

## 2021-12-25 RX ORDER — LIDOCAINE HYDROCHLORIDE 10 MG/ML
10 INJECTION, SOLUTION EPIDURAL; INFILTRATION; INTRACAUDAL; PERINEURAL ONCE
Status: COMPLETED | OUTPATIENT
Start: 2021-12-25 | End: 2021-12-25

## 2021-12-25 RX ADMIN — LIDOCAINE HYDROCHLORIDE 10 ML: 10 INJECTION, SOLUTION EPIDURAL; INFILTRATION; INTRACAUDAL; PERINEURAL at 14:16

## 2021-12-25 RX ADMIN — TETANUS TOXOID, REDUCED DIPHTHERIA TOXOID AND ACELLULAR PERTUSSIS VACCINE, ADSORBED 0.5 ML: 5; 2.5; 8; 8; 2.5 SUSPENSION INTRAMUSCULAR at 14:25

## 2021-12-25 RX ADMIN — Medication 1 APPLICATION: at 15:18

## 2021-12-25 NOTE — ED PROVIDER NOTES
Subjective     History provided by:  Patient  Laceration  Location:  Finger  Finger laceration location:  L thumb  Length:  2  Depth:  Cutaneous  Quality: avulsion    Bleeding: controlled with pressure    Time since incident:  30 minutes  Laceration mechanism:  Metal edge  Pain details:     Quality:  Sharp    Severity:  Mild    Timing:  Constant    Progression:  Unchanged  Foreign body present:  No foreign bodies  Relieved by:  Pressure  Worsened by:  Nothing  Ineffective treatments:  None tried  Tetanus status:  Out of date  Associated symptoms: no fever, no focal weakness, no numbness, no rash, no redness, no swelling and no streaking        Review of Systems   Constitutional: Negative for chills and fever.   HENT: Negative for congestion, ear pain and sore throat.    Eyes: Negative for pain.   Respiratory: Negative for cough, chest tightness and shortness of breath.    Cardiovascular: Negative for chest pain.   Gastrointestinal: Negative for abdominal pain, diarrhea, nausea and vomiting.   Genitourinary: Negative for flank pain and hematuria.   Musculoskeletal: Negative for joint swelling.   Skin: Negative for pallor and rash.   Neurological: Negative for focal weakness, seizures and headaches.   All other systems reviewed and are negative.      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   • Fibromyalgia 10/26/2018   • Headache    • Hypothyroidism 03/08/2021   • Insomnia 10/26/2018   • Kidney disease    • Migraine    • Osteoporosis 12/12/2019   • Thyroid disease    • Vitamin D deficiency 12/12/2019       No Known Allergies    Past Surgical History:   Procedure Laterality Date   • CHOLECYSTECTOMY     • CYSTOSCOPY     • HYSTERECTOMY  2010   • KIDNEY SURGERY     • OTHER SURGICAL HISTORY      JOINT SURGERY   • OTHER SURGICAL HISTORY      LITHOTRIPSY       Family History   Problem Relation Age of Onset   • Arthritis Mother    • Stroke  Father    • Heart disease Father    • Diabetes Father    • Osteoporosis Father    • Cancer Daughter        Social History     Socioeconomic History   • Marital status:    Tobacco Use   • Smoking status: Never Smoker   • Smokeless tobacco: Never Used   Vaping Use   • Vaping Use: Never used   Substance and Sexual Activity   • Alcohol use: Yes     Comment: RARELY   • Drug use: Never   • Sexual activity: Defer           Objective   Physical Exam  Vitals and nursing note reviewed.   Constitutional:       General: She is not in acute distress.     Appearance: Normal appearance. She is not toxic-appearing.   HENT:      Head: Normocephalic and atraumatic.      Mouth/Throat:      Mouth: Mucous membranes are moist.   Eyes:      General: No scleral icterus.  Cardiovascular:      Rate and Rhythm: Normal rate and regular rhythm.      Pulses: Normal pulses.      Heart sounds: Normal heart sounds.   Pulmonary:      Effort: Pulmonary effort is normal. No respiratory distress.      Breath sounds: Normal breath sounds.   Abdominal:      General: Abdomen is flat.      Palpations: Abdomen is soft.      Tenderness: There is no abdominal tenderness.   Musculoskeletal:         General: Normal range of motion.      Right hand: Laceration present. Normal range of motion. Normal strength. Normal sensation. There is no disruption of two-point discrimination. Normal capillary refill. Normal pulse.        Hands:       Cervical back: Normal range of motion and neck supple.   Skin:     General: Skin is warm and dry.   Neurological:      Mental Status: She is alert and oriented to person, place, and time. Mental status is at baseline.         Laceration Repair    Date/Time: 12/25/2021 2:45 PM  Performed by: Gigi Vila APRN  Authorized by: Kevyn Dunn DO     Consent:     Consent obtained:  Verbal    Consent given by:  Patient    Risks discussed:  Infection, poor cosmetic result, poor wound healing and pain    Alternatives  discussed:  No treatment  Anesthesia (see MAR for exact dosages):     Anesthesia method:  Local infiltration    Local anesthetic:  Lidocaine 1% w/o epi  Laceration details:     Location:  Finger    Finger location:  L thumb    Length (cm):  2    Depth (mm):  5  Exploration:     Hemostasis achieved with:  Direct pressure    Wound extent: no foreign bodies/material noted, no nerve damage noted and no tendon damage noted    Treatment:     Area cleansed with:  Betadine    Amount of cleaning:  Standard    Irrigation solution:  Sterile saline  Skin repair:     Repair method:  Sutures    Suture size:  5-0    Suture material:  Nylon    Suture technique:  Simple interrupted    Number of sutures:  2  Approximation:     Approximation:  Close  Post-procedure details:     Dressing:  Antibiotic ointment and non-adherent dressing    Patient tolerance of procedure:  Tolerated well, no immediate complications               ED Course                                                 MDM  Number of Diagnoses or Management Options  Laceration of left thumb without foreign body without damage to nail, initial encounter: minor  Diagnosis management comments: The patient presented with a laceration in need of repair. See laceration repair note for details. The wound was irrigated with copious normal saline irrigation. 2 sutures were used to approximate the wound edges. Tetanus was given. The patient tolerated the procedure well. Acute bleeding has ceased and the wound was approximated in the emergency department. Patient was counseled to keep the wound clean, dry, and out of the sun. Patient was counseled to change dressings daily. Patient was advised to return to the ED for worsening erythema, pain, swelling, fever, excessive drainage or signs of infection. They were counseled to follow up for suture removal as described in the discharge instructions. Patient verbalizes understanding and agrees to follow up as instructed.    Risk of  Complications, Morbidity, and/or Mortality  Presenting problems: low  Diagnostic procedures: minimal  Management options: low    Patient Progress  Patient progress: improved      Final diagnoses:   Laceration of left thumb without foreign body without damage to nail, initial encounter       ED Disposition  ED Disposition     ED Disposition Condition Comment    Discharge Stable           Jarrod Thomas PA  [address]  [place] [address]  [phone]    In 1 week  For suture removal         Medication List      No changes were made to your prescriptions during this visit.          Gigi Vila, APRN  12/25/21 1552

## 2021-12-29 ENCOUNTER — APPOINTMENT (OUTPATIENT)
Dept: BONE DENSITY | Facility: HOSPITAL | Age: 46
End: 2021-12-29

## 2022-01-02 DIAGNOSIS — F41.9 ANXIETY DISORDER, UNSPECIFIED TYPE: ICD-10-CM

## 2022-01-03 RX ORDER — LORAZEPAM 1 MG/1
TABLET ORAL
Qty: 90 TABLET | Refills: 0 | Status: SHIPPED | OUTPATIENT
Start: 2022-01-03 | End: 2022-02-07

## 2022-01-14 DIAGNOSIS — G47.00 INSOMNIA, UNSPECIFIED TYPE: ICD-10-CM

## 2022-01-14 RX ORDER — ZOLPIDEM TARTRATE 10 MG/1
TABLET ORAL
Qty: 90 TABLET | Refills: 1 | Status: SHIPPED | OUTPATIENT
Start: 2022-01-14 | End: 2022-07-14

## 2022-02-07 DIAGNOSIS — F41.9 ANXIETY DISORDER, UNSPECIFIED TYPE: ICD-10-CM

## 2022-02-07 DIAGNOSIS — M79.7 FIBROMYALGIA: ICD-10-CM

## 2022-02-07 RX ORDER — TRAMADOL HYDROCHLORIDE 50 MG/1
TABLET ORAL
Qty: 270 TABLET | Refills: 1 | Status: SHIPPED | OUTPATIENT
Start: 2022-02-07 | End: 2022-05-09

## 2022-02-07 RX ORDER — UBROGEPANT 100 MG/1
TABLET ORAL
Qty: 10 TABLET | Refills: 3 | Status: SHIPPED | OUTPATIENT
Start: 2022-02-07 | End: 2022-02-24

## 2022-02-07 RX ORDER — LORAZEPAM 1 MG/1
TABLET ORAL
Qty: 90 TABLET | Refills: 0 | Status: SHIPPED | OUTPATIENT
Start: 2022-02-07 | End: 2022-03-11

## 2022-02-07 NOTE — TELEPHONE ENCOUNTER
Pt requesting refills on     Ativan 1MG 1 Q 8HR PRN #90 0RF  LRX 01/03/22    Tramadol 50MG 1 Q 8HR PRN #270 0RF  LRX 08/02/22    Ubrogepant 100 MG     OV 09/09/21    F/U 03/1/22    Sukhdeep 01/03/22    UDS 01/19/21 - Pt aware to update at next ov.

## 2022-02-10 RX ORDER — ESTRADIOL 0.05 MG/D
FILM, EXTENDED RELEASE TRANSDERMAL
Qty: 24 PATCH | Refills: 1 | Status: SHIPPED | OUTPATIENT
Start: 2022-02-10 | End: 2022-07-15

## 2022-02-24 DIAGNOSIS — G43.909 MIGRAINE WITHOUT STATUS MIGRAINOSUS, NOT INTRACTABLE, UNSPECIFIED MIGRAINE TYPE: Primary | ICD-10-CM

## 2022-02-24 RX ORDER — ATOGEPANT 60 MG/1
60 TABLET ORAL DAILY
Qty: 30 TABLET | Refills: 11 | Status: SHIPPED | OUTPATIENT
Start: 2022-02-24 | End: 2022-09-12 | Stop reason: SDUPTHER

## 2022-02-25 ENCOUNTER — TELEPHONE (OUTPATIENT)
Dept: FAMILY MEDICINE CLINIC | Facility: CLINIC | Age: 47
End: 2022-02-25

## 2022-03-11 ENCOUNTER — OFFICE VISIT (OUTPATIENT)
Dept: FAMILY MEDICINE CLINIC | Facility: CLINIC | Age: 47
End: 2022-03-11

## 2022-03-11 VITALS
SYSTOLIC BLOOD PRESSURE: 120 MMHG | BODY MASS INDEX: 23.35 KG/M2 | HEART RATE: 105 BPM | WEIGHT: 131.8 LBS | HEIGHT: 63 IN | DIASTOLIC BLOOD PRESSURE: 84 MMHG | OXYGEN SATURATION: 95 %

## 2022-03-11 DIAGNOSIS — Z79.899 LONG TERM USE OF DRUG: ICD-10-CM

## 2022-03-11 DIAGNOSIS — F41.9 ANXIETY DISORDER, UNSPECIFIED TYPE: ICD-10-CM

## 2022-03-11 DIAGNOSIS — Z00.00 ANNUAL PHYSICAL EXAM: ICD-10-CM

## 2022-03-11 DIAGNOSIS — Z12.11 SCREEN FOR COLON CANCER: ICD-10-CM

## 2022-03-11 DIAGNOSIS — R19.7 DIARRHEA, UNSPECIFIED TYPE: ICD-10-CM

## 2022-03-11 DIAGNOSIS — Z13.220 SCREENING FOR LIPID DISORDERS: ICD-10-CM

## 2022-03-11 DIAGNOSIS — G43.809 OTHER MIGRAINE WITHOUT STATUS MIGRAINOSUS, NOT INTRACTABLE: ICD-10-CM

## 2022-03-11 DIAGNOSIS — Z12.31 ENCOUNTER FOR SCREENING MAMMOGRAM FOR MALIGNANT NEOPLASM OF BREAST: ICD-10-CM

## 2022-03-11 DIAGNOSIS — E55.9 VITAMIN D DEFICIENCY: Primary | ICD-10-CM

## 2022-03-11 DIAGNOSIS — E03.9 HYPOTHYROIDISM, UNSPECIFIED TYPE: ICD-10-CM

## 2022-03-11 LAB
AMPHET+METHAMPHET UR QL: NEGATIVE
AMPHETAMINE INTERNAL CONTROL: ABNORMAL
AMPHETAMINES UR QL: NEGATIVE
BARBITURATE INTERNAL CONTROL: ABNORMAL
BARBITURATES UR QL SCN: NEGATIVE
BENZODIAZ UR QL SCN: POSITIVE
BENZODIAZEPINE INTERNAL CONTROL: ABNORMAL
BUPRENORPHINE INTERNAL CONTROL: ABNORMAL
BUPRENORPHINE SERPL-MCNC: NEGATIVE NG/ML
CANNABINOIDS SERPL QL: NEGATIVE
COCAINE INTERNAL CONTROL: ABNORMAL
COCAINE UR QL: NEGATIVE
EXPIRATION DATE: ABNORMAL
Lab: ABNORMAL
MDMA (ECSTASY) INTERNAL CONTROL: ABNORMAL
MDMA UR QL SCN: NEGATIVE
METHADONE INTERNAL CONTROL: ABNORMAL
METHADONE UR QL SCN: NEGATIVE
METHAMPHETAMINE INTERNAL CONTROL: ABNORMAL
OPIATES INTERNAL CONTROL: ABNORMAL
OPIATES UR QL: NEGATIVE
OXYCODONE INTERNAL CONTROL: ABNORMAL
OXYCODONE UR QL SCN: NEGATIVE
PCP UR QL SCN: NEGATIVE
PHENCYCLIDINE INTERNAL CONTROL: ABNORMAL
THC INTERNAL CONTROL: ABNORMAL

## 2022-03-11 PROCEDURE — 99396 PREV VISIT EST AGE 40-64: CPT | Performed by: PHYSICIAN ASSISTANT

## 2022-03-11 PROCEDURE — 80305 DRUG TEST PRSMV DIR OPT OBS: CPT | Performed by: PHYSICIAN ASSISTANT

## 2022-03-11 RX ORDER — LORAZEPAM 1 MG/1
TABLET ORAL
Qty: 90 TABLET | Refills: 0 | Status: SHIPPED | OUTPATIENT
Start: 2022-03-11 | End: 2022-04-15

## 2022-03-11 NOTE — PROGRESS NOTES
Chief Complaint  Hypothyroidism (Routine follow up), Vitamin D Deficiency, and Anxiety    Subjective          Susan Bustos presents to [facility]  History of Present Illness     Pt presents today for routine follow up.    Pt states no new issues or complaints to discuss.    Pt states she will discuss cln vs cologuard.    Labs 10/20  uds today  bernarda 1/22  mammo 4/21  cln 2008    Preventative Counseling:  discussed with patient healthy diet and active lifestyle modifications.  Adequate sleep, daily exercise, hobbies, etc.  Avoid alcohol in excess, avoid tobacco and illicit drugs. Dental visits twice yearly for routine teeth cleanings.    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   • Fibromyalgia 10/26/2018   • Headache    • Hypothyroidism 03/08/2021   • Insomnia 10/26/2018   • Kidney disease    • Migraine    • Osteoporosis 12/12/2019   • Thyroid disease    • Vitamin D deficiency 12/12/2019      Family History   Problem Relation Age of Onset   • Arthritis Mother    • Stroke Father    • Heart disease Father    • Diabetes Father    • Osteoporosis Father    • Cancer Daughter       Past Surgical History:   Procedure Laterality Date   • CHOLECYSTECTOMY     • CYSTOSCOPY     • HYSTERECTOMY  2010   • KIDNEY SURGERY     • OTHER SURGICAL HISTORY      JOINT SURGERY   • OTHER SURGICAL HISTORY      LITHOTRIPSY        Current Outpatient Medications:   •  aspirin 81 MG chewable tablet, Chew 81 mg Daily., Disp: , Rfl:   •  Atogepant (Qulipta) 60 MG tablet, Take 1 tablet by mouth Daily., Disp: 30 tablet, Rfl: 11  •  estradiol (MINIVELLE, VIVELLE-DOT) 0.05 MG/24HR patch, APPLY 1 PATCH TWICE WEEKLY, Disp: 24 patch, Rfl: 1  •  hyoscyamine (LEVSIN) 0.125 MG SL tablet, Take 1 tablet by mouth Every 4 (Four) Hours As Needed for Cramping., Disp: 60 tablet, Rfl: 5  •  LORazepam (ATIVAN) 1 MG tablet, TAKE 1 TABLET BY MOUTH  "EVERY 8 HOURS AS NEEDED FOR ANXIETY, Disp: 90 tablet, Rfl: 0  •  Synthroid 25 MCG tablet, TAKE 1 TABLET DAILY, Disp: 90 tablet, Rfl: 3  •  traMADol (ULTRAM) 50 MG tablet, TAKE 1 TABLET BY MOUTH EVERY 8 HOURS AS NEEDED, Disp: 270 tablet, Rfl: 1  •  zolpidem (AMBIEN) 10 MG tablet, TAKE 1 TABLET BY MOUTH EVERYDAY AT BEDTIME, Disp: 90 tablet, Rfl: 1    Objective     Vital Signs:     /84 (BP Location: Right arm)   Pulse 105   Ht 160 cm (63\")   Wt 59.8 kg (131 lb 12.8 oz)   SpO2 95%   BMI 23.35 kg/m²    Estimated body mass index is 23.35 kg/m² as calculated from the following:    Height as of this encounter: 160 cm (63\").    Weight as of this encounter: 59.8 kg (131 lb 12.8 oz).     Wt Readings from Last 3 Encounters:   03/11/22 59.8 kg (131 lb 12.8 oz)   09/22/21 58.2 kg (128 lb 3.2 oz)   12/12/19 51.5 kg (113 lb 8 oz)     BP Readings from Last 3 Encounters:   03/11/22 120/84   12/25/21 129/86   12/12/19 122/70       Physical Exam  Vitals and nursing note reviewed.   Constitutional:       Appearance: Normal appearance.   HENT:      Head: Normocephalic and atraumatic.   Cardiovascular:      Rate and Rhythm: Normal rate and regular rhythm.   Pulmonary:      Effort: Pulmonary effort is normal.      Breath sounds: Normal breath sounds.   Musculoskeletal:      Cervical back: Neck supple.   Neurological:      Mental Status: She is alert.   Psychiatric:         Mood and Affect: Mood normal.         Behavior: Behavior normal.         Result Review :                       Assessment and Plan      Diagnoses and all orders for this visit:    1. Annual physical exam (Primary)    2. Encounter for screening mammogram for malignant neoplasm of breast  -     Mammo Screening Digital Tomosynthesis Bilateral With CAD; Future    3. Long term use of drug  -     POC Urine Drug Screen Premier Bio-Cup    4. Screen for colon cancer  -     Amb referral for Screening Colonoscopy    5. Diarrhea, unspecified type  -     hyoscyamine " (LEVSIN) 0.125 MG SL tablet; Take 1 tablet by mouth Every 4 (Four) Hours As Needed for Cramping.  Dispense: 60 tablet; Refill: 5       Pt will schedule PAP    Follow Up     Return in about 6 months (around 9/11/2022).    Patient was given instructions and counseling regarding her condition or for health maintenance advice. Please see specific information pulled into the AVS if appropriate.     I have reviewed information obtained and documented by others and I have confirmed the accuracy of this documented note.    REYNA Gama

## 2022-04-05 ENCOUNTER — LAB (OUTPATIENT)
Dept: LAB | Facility: HOSPITAL | Age: 47
End: 2022-04-05

## 2022-04-05 LAB
BACTERIA UR QL AUTO: ABNORMAL /HPF
BASOPHILS # BLD AUTO: 0.05 10*3/MM3 (ref 0–0.2)
BASOPHILS NFR BLD AUTO: 0.7 % (ref 0–1.5)
BILIRUB UR QL STRIP: NEGATIVE
CLARITY UR: CLEAR
COLOR UR: ABNORMAL
DEPRECATED RDW RBC AUTO: 44.5 FL (ref 37–54)
EOSINOPHIL # BLD AUTO: 0.34 10*3/MM3 (ref 0–0.4)
EOSINOPHIL NFR BLD AUTO: 4.7 % (ref 0.3–6.2)
ERYTHROCYTE [DISTWIDTH] IN BLOOD BY AUTOMATED COUNT: 12.2 % (ref 12.3–15.4)
GLUCOSE UR STRIP-MCNC: NEGATIVE MG/DL
HCT VFR BLD AUTO: 46.7 % (ref 34–46.6)
HGB BLD-MCNC: 15.5 G/DL (ref 12–15.9)
HGB UR QL STRIP.AUTO: NEGATIVE
HYALINE CASTS UR QL AUTO: ABNORMAL /LPF
IMM GRANULOCYTES # BLD AUTO: 0.03 10*3/MM3 (ref 0–0.05)
IMM GRANULOCYTES NFR BLD AUTO: 0.4 % (ref 0–0.5)
KETONES UR QL STRIP: ABNORMAL
LEUKOCYTE ESTERASE UR QL STRIP.AUTO: NEGATIVE
LYMPHOCYTES # BLD AUTO: 1.32 10*3/MM3 (ref 0.7–3.1)
LYMPHOCYTES NFR BLD AUTO: 18.1 % (ref 19.6–45.3)
MCH RBC QN AUTO: 32.4 PG (ref 26.6–33)
MCHC RBC AUTO-ENTMCNC: 33.2 G/DL (ref 31.5–35.7)
MCV RBC AUTO: 97.7 FL (ref 79–97)
MONOCYTES # BLD AUTO: 0.76 10*3/MM3 (ref 0.1–0.9)
MONOCYTES NFR BLD AUTO: 10.4 % (ref 5–12)
NEUTROPHILS NFR BLD AUTO: 4.78 10*3/MM3 (ref 1.7–7)
NEUTROPHILS NFR BLD AUTO: 65.7 % (ref 42.7–76)
NITRITE UR QL STRIP: NEGATIVE
NRBC BLD AUTO-RTO: 0 /100 WBC (ref 0–0.2)
PH UR STRIP.AUTO: 6 [PH] (ref 5–8)
PLATELET # BLD AUTO: 281 10*3/MM3 (ref 140–450)
PMV BLD AUTO: 9.8 FL (ref 6–12)
PROT UR QL STRIP: ABNORMAL
RBC # BLD AUTO: 4.78 10*6/MM3 (ref 3.77–5.28)
RBC # UR STRIP: ABNORMAL /HPF
REF LAB TEST METHOD: ABNORMAL
SP GR UR STRIP: 1.03 (ref 1–1.03)
SQUAMOUS #/AREA URNS HPF: ABNORMAL /HPF
UROBILINOGEN UR QL STRIP: ABNORMAL
WBC # UR STRIP: ABNORMAL /HPF
WBC NRBC COR # BLD: 7.28 10*3/MM3 (ref 3.4–10.8)

## 2022-04-05 PROCEDURE — 81001 URINALYSIS AUTO W/SCOPE: CPT | Performed by: PHYSICIAN ASSISTANT

## 2022-04-05 PROCEDURE — 80061 LIPID PANEL: CPT | Performed by: PHYSICIAN ASSISTANT

## 2022-04-05 PROCEDURE — 80050 GENERAL HEALTH PANEL: CPT | Performed by: PHYSICIAN ASSISTANT

## 2022-04-05 PROCEDURE — 36415 COLL VENOUS BLD VENIPUNCTURE: CPT | Performed by: PHYSICIAN ASSISTANT

## 2022-04-06 LAB
ALBUMIN SERPL-MCNC: 4.4 G/DL (ref 3.5–5.2)
ALBUMIN/GLOB SERPL: 1.6 G/DL
ALP SERPL-CCNC: 101 U/L (ref 39–117)
ALT SERPL W P-5'-P-CCNC: 10 U/L (ref 1–33)
ANION GAP SERPL CALCULATED.3IONS-SCNC: 11 MMOL/L (ref 5–15)
AST SERPL-CCNC: 16 U/L (ref 1–32)
BILIRUB SERPL-MCNC: 0.4 MG/DL (ref 0–1.2)
BUN SERPL-MCNC: 5 MG/DL (ref 6–20)
BUN/CREAT SERPL: 5.3 (ref 7–25)
CALCIUM SPEC-SCNC: 9 MG/DL (ref 8.6–10.5)
CHLORIDE SERPL-SCNC: 101 MMOL/L (ref 98–107)
CHOLEST SERPL-MCNC: 220 MG/DL (ref 0–200)
CO2 SERPL-SCNC: 28 MMOL/L (ref 22–29)
CREAT SERPL-MCNC: 0.94 MG/DL (ref 0.57–1)
EGFRCR SERPLBLD CKD-EPI 2021: 74.5 ML/MIN/1.73
GLOBULIN UR ELPH-MCNC: 2.7 GM/DL
GLUCOSE SERPL-MCNC: 85 MG/DL (ref 65–99)
HDLC SERPL-MCNC: 75 MG/DL (ref 40–60)
LDLC SERPL CALC-MCNC: 125 MG/DL (ref 0–100)
LDLC/HDLC SERPL: 1.63 {RATIO}
POTASSIUM SERPL-SCNC: 3.5 MMOL/L (ref 3.5–5.2)
PROT SERPL-MCNC: 7.1 G/DL (ref 6–8.5)
SODIUM SERPL-SCNC: 140 MMOL/L (ref 136–145)
TRIGL SERPL-MCNC: 115 MG/DL (ref 0–150)
TSH SERPL DL<=0.05 MIU/L-ACNC: 1.93 UIU/ML (ref 0.27–4.2)
VLDLC SERPL-MCNC: 20 MG/DL (ref 5–40)

## 2022-04-14 DIAGNOSIS — F41.9 ANXIETY DISORDER, UNSPECIFIED TYPE: ICD-10-CM

## 2022-04-15 RX ORDER — LORAZEPAM 1 MG/1
TABLET ORAL
Qty: 90 TABLET | Refills: 0 | Status: SHIPPED | OUTPATIENT
Start: 2022-04-15 | End: 2022-07-15

## 2022-04-26 LAB — 25(OH)D3 SERPL-MCNC: 27.8 NG/ML (ref 30–100)

## 2022-04-26 PROCEDURE — 36415 COLL VENOUS BLD VENIPUNCTURE: CPT | Performed by: PHYSICIAN ASSISTANT

## 2022-04-26 PROCEDURE — 82306 VITAMIN D 25 HYDROXY: CPT | Performed by: PHYSICIAN ASSISTANT

## 2022-04-27 ENCOUNTER — TELEPHONE (OUTPATIENT)
Dept: FAMILY MEDICINE CLINIC | Facility: CLINIC | Age: 47
End: 2022-04-27

## 2022-04-27 RX ORDER — ERGOCALCIFEROL 1.25 MG/1
50000 CAPSULE ORAL WEEKLY
Qty: 13 CAPSULE | Refills: 1 | Status: SHIPPED | OUTPATIENT
Start: 2022-04-27 | End: 2022-09-12 | Stop reason: SDUPTHER

## 2022-04-27 NOTE — TELEPHONE ENCOUNTER
----- Message from REYNA Gama sent at 4/27/2022  9:40 AM EDT -----  Vitamin D Deficiency noted - begin Vitamin D 50,000 IU weekly #[id number]

## 2022-05-09 DIAGNOSIS — M79.7 FIBROMYALGIA: ICD-10-CM

## 2022-05-09 RX ORDER — TRAMADOL HYDROCHLORIDE 50 MG/1
TABLET ORAL
Qty: 270 TABLET | Refills: 1 | Status: SHIPPED | OUTPATIENT
Start: 2022-05-09 | End: 2022-08-18

## 2022-05-18 ENCOUNTER — HOSPITAL ENCOUNTER (OUTPATIENT)
Dept: MAMMOGRAPHY | Facility: HOSPITAL | Age: 47
Discharge: HOME | End: 2022-05-18
Admitting: PHYSICIAN ASSISTANT

## 2022-05-18 DIAGNOSIS — Z12.31 ENCOUNTER FOR SCREENING MAMMOGRAM FOR MALIGNANT NEOPLASM OF BREAST: ICD-10-CM

## 2022-05-18 PROCEDURE — 77067 SCR MAMMO BI INCL CAD: CPT

## 2022-05-18 PROCEDURE — 77063 BREAST TOMOSYNTHESIS BI: CPT

## 2022-05-20 ENCOUNTER — TELEPHONE (OUTPATIENT)
Dept: FAMILY MEDICINE CLINIC | Facility: CLINIC | Age: 47
End: 2022-05-20

## 2022-05-20 NOTE — TELEPHONE ENCOUNTER
----- Message from REYNA Gama sent at 5/19/2022  8:48 PM EDT -----  Your mammogram did not show evidence of breast cancer.  Routine follow up yearly for mammograms is recommended.  Remember to perform your monthly self breast exams (SBE).  Notify me immediately if you know notice any abnormalities.

## 2022-06-29 ENCOUNTER — PREP FOR SURGERY (OUTPATIENT)
Dept: OTHER | Facility: HOSPITAL | Age: 47
End: 2022-06-29

## 2022-06-29 ENCOUNTER — OFFICE VISIT (OUTPATIENT)
Dept: SURGERY | Facility: CLINIC | Age: 47
End: 2022-06-29

## 2022-06-29 VITALS — HEIGHT: 63 IN | HEART RATE: 113 BPM | RESPIRATION RATE: 16 BRPM | BODY MASS INDEX: 23.39 KG/M2 | WEIGHT: 132 LBS

## 2022-06-29 DIAGNOSIS — Z12.11 SCREENING FOR MALIGNANT NEOPLASM OF COLON: Primary | ICD-10-CM

## 2022-06-29 PROCEDURE — S0285 CNSLT BEFORE SCREEN COLONOSC: HCPCS | Performed by: NURSE PRACTITIONER

## 2022-06-29 RX ORDER — SODIUM CHLORIDE 0.9 % (FLUSH) 0.9 %
10 SYRINGE (ML) INJECTION AS NEEDED
Status: CANCELLED | OUTPATIENT
Start: 2022-06-29

## 2022-06-29 RX ORDER — UBROGEPANT 100 MG/1
100 TABLET ORAL SEE ADMIN INSTRUCTIONS
COMMUNITY
Start: 2022-06-11 | End: 2022-09-12

## 2022-06-29 RX ORDER — SODIUM CHLORIDE 0.9 % (FLUSH) 0.9 %
3 SYRINGE (ML) INJECTION EVERY 12 HOURS SCHEDULED
Status: CANCELLED | OUTPATIENT
Start: 2022-06-29

## 2022-06-29 RX ORDER — POLYETHYLENE GLYCOL 3350 17 G/17G
POWDER, FOR SOLUTION ORAL
Qty: 238 PACKET | Refills: 0 | Status: SHIPPED | OUTPATIENT
Start: 2022-06-29 | End: 2022-09-12

## 2022-06-29 RX ORDER — TOPIRAMATE 50 MG/1
50 TABLET, FILM COATED ORAL 2 TIMES DAILY
COMMUNITY
Start: 2022-06-09 | End: 2022-09-12 | Stop reason: SDUPTHER

## 2022-06-29 RX ORDER — UBROGEPANT 100 MG/1
TABLET ORAL
Qty: 10 TABLET | Refills: 3 | Status: SHIPPED | OUTPATIENT
Start: 2022-06-29 | End: 2022-06-30 | Stop reason: SDUPTHER

## 2022-06-29 NOTE — PROGRESS NOTES
Chief Complaint: Colonoscopy (consult)    Subjective      Colonoscopy consultation       History of Present Illness  Susan Bustos is a 47 y.o. female presents to [facility] for colonoscopy consultation.    Patient presents today on referral from Dr. Kulwinder Gayle.    Patient presents today for screening colonoscopy.  She denies any abdominal pain, change in bowel habit, rectal bleeding.    Denies any family history of colorectal cancer.    8/04: Colonoscopy ([facility]): normal colon.    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   • Fibromyalgia 10/26/2018   • Headache    • Hypothyroidism 03/08/2021   • Insomnia 10/26/2018   • Kidney disease    • Migraine    • Osteoporosis 12/12/2019   • Thyroid disease    • Vitamin D deficiency 12/12/2019       Past Surgical History:   Procedure Laterality Date   • CHOLECYSTECTOMY     • CYSTOSCOPY     • HYSTERECTOMY  2010   • KIDNEY SURGERY     • OTHER SURGICAL HISTORY      JOINT SURGERY   • OTHER SURGICAL HISTORY      LITHOTRIPSY       Outpatient Medications Marked as Taking for the 6/29/22 encounter (Office Visit) with Shiela Teran APRN   Medication Sig Dispense Refill   • aspirin 81 MG chewable tablet Chew 81 mg Daily.     • Atogepant (Qulipta) 60 MG tablet Take 1 tablet by mouth Daily. 30 tablet 11   • estradiol (MINIVELLE, VIVELLE-DOT) 0.05 MG/24HR patch APPLY 1 PATCH TWICE WEEKLY 24 patch 1   • hyoscyamine (LEVSIN) 0.125 MG SL tablet Take 1 tablet by mouth Every 4 (Four) Hours As Needed for Cramping. 60 tablet 5   • LORazepam (ATIVAN) 1 MG tablet TAKE 1 TABLET BY MOUTH EVERY 8 HOURS AS NEEDED FOR ANXIETY 90 tablet 0   • Synthroid 25 MCG tablet TAKE 1 TABLET DAILY 90 tablet 3   • topiramate (TOPAMAX) 50 MG tablet Take 50 mg by mouth 2 (Two) Times a Day.     • traMADol (ULTRAM) 50 MG tablet TAKE 1 TABLET BY MOUTH EVERY 8 HOURS AS NEEDED 270 tablet 1  "  • ubrogepant 100 MG tablet Take 100 mg by mouth See Admin Instructions.     • vitamin D (ERGOCALCIFEROL) 1.25 MG (30742 UT) capsule capsule Take 1 capsule by mouth 1 (One) Time Per Week. 13 capsule 1   • zolpidem (AMBIEN) 10 MG tablet TAKE 1 TABLET BY MOUTH EVERYDAY AT BEDTIME 90 tablet 1       No Known Allergies     Family History   Problem Relation Age of Onset   • Arthritis Mother    • Stroke Father    • Heart disease Father    • Diabetes Father    • Osteoporosis Father    • Cancer Daughter        Social History     Socioeconomic History   • Marital status:    Tobacco Use   • Smoking status: Never Smoker   • Smokeless tobacco: Never Used   Vaping Use   • Vaping Use: Never used   Substance and Sexual Activity   • Alcohol use: Yes     Comment: RARELY   • Drug use: Never   • Sexual activity: Defer       Review of Systems   Constitutional: Negative for chills and fever.   Gastrointestinal: Negative for abdominal distention, abdominal pain, anal bleeding, blood in stool, constipation, diarrhea and rectal pain.        Vital Signs:   Pulse 113   Resp 16   Ht 160 cm (63\")   Wt 59.9 kg (132 lb)   BMI 23.38 kg/m²      Physical Exam  Constitutional:       Appearance: Normal appearance.   HENT:      Head: Normocephalic.   Cardiovascular:      Rate and Rhythm: Normal rate.   Pulmonary:      Effort: Pulmonary effort is normal.   Abdominal:      General: Abdomen is flat.      Palpations: Abdomen is soft.   Skin:     General: Skin is warm and dry.   Neurological:      General: No focal deficit present.      Mental Status: She is alert and oriented to person, place, and time.   Psychiatric:         Mood and Affect: Mood normal.         Thought Content: Thought content normal.          Result Review :          []  Laboratory  []  Radiology  []  Pathology  []  Microbiology  []  EKG/Telemetry   []  Cardiology/Vascular   [x]  Old records  Today I have reviewed Dr. Ferraro's previous EGD& colonoscopy.      Assessment and " Plan    Diagnoses and all orders for this visit:    1. Screening for malignant neoplasm of colon (Primary)    Other orders  -     polyethylene glycol (MIRALAX) 17 g packet; Take as directed.  Instructions given in office.  Dispense: 238 g bottle  Dispense: 238 packet; Refill: 0    orange prep    Follow Up   Return for Schedule colonoscopy with Dr. Pool on 11/18/2022 at [facility].     Hospital arrival time: 0600.    Possible risks/complications, benefits, and alternatives to surgical or invasive procedure have been explained to patient and/or legal guardian.     Patient has been evaluated and can tolerate anesthesia and/or sedation. Risks, benefits, and alternatives to anesthesia and sedation have been explained to patient and/or legal guardian.  Patient verbalizes understanding is will proceed with above plan.    Patient was given instructions and counseling regarding her condition or for health maintenance advice. Please see specific information pulled into the AVS if appropriate.

## 2022-06-30 ENCOUNTER — TELEPHONE (OUTPATIENT)
Dept: FAMILY MEDICINE CLINIC | Facility: CLINIC | Age: 47
End: 2022-06-30

## 2022-06-30 DIAGNOSIS — G43.909 MIGRAINE WITHOUT STATUS MIGRAINOSUS, NOT INTRACTABLE, UNSPECIFIED MIGRAINE TYPE: Primary | ICD-10-CM

## 2022-07-14 DIAGNOSIS — F41.9 ANXIETY DISORDER, UNSPECIFIED TYPE: ICD-10-CM

## 2022-07-14 DIAGNOSIS — G47.00 INSOMNIA, UNSPECIFIED TYPE: ICD-10-CM

## 2022-07-15 RX ORDER — ZOLPIDEM TARTRATE 10 MG/1
TABLET ORAL
Qty: 90 TABLET | Refills: 1 | Status: SHIPPED | OUTPATIENT
Start: 2022-07-15 | End: 2023-01-25 | Stop reason: SDUPTHER

## 2022-07-15 RX ORDER — LORAZEPAM 1 MG/1
TABLET ORAL
Qty: 90 TABLET | Refills: 0 | Status: SHIPPED | OUTPATIENT
Start: 2022-07-15 | End: 2022-08-18

## 2022-07-15 RX ORDER — ESTRADIOL 0.05 MG/D
FILM, EXTENDED RELEASE TRANSDERMAL
Qty: 24 PATCH | Refills: 1 | Status: SHIPPED | OUTPATIENT
Start: 2022-07-15 | End: 2022-09-12 | Stop reason: SDUPTHER

## 2022-08-17 DIAGNOSIS — F41.9 ANXIETY DISORDER, UNSPECIFIED TYPE: ICD-10-CM

## 2022-08-17 DIAGNOSIS — M79.7 FIBROMYALGIA: ICD-10-CM

## 2022-08-17 NOTE — TELEPHONE ENCOUNTER
LRF 07/15/22  UDS 03/11/22  OV  03/11/22  No follow up on file.   Sukhdeep Done today and scanned into media

## 2022-08-18 RX ORDER — LORAZEPAM 1 MG/1
TABLET ORAL
Qty: 90 TABLET | Refills: 0 | Status: SHIPPED | OUTPATIENT
Start: 2022-08-18 | End: 2022-09-12 | Stop reason: SDUPTHER

## 2022-08-18 RX ORDER — TRAMADOL HYDROCHLORIDE 50 MG/1
TABLET ORAL
Qty: 270 TABLET | Refills: 1 | Status: SHIPPED | OUTPATIENT
Start: 2022-08-18 | End: 2022-09-12 | Stop reason: SDUPTHER

## 2022-09-12 ENCOUNTER — OFFICE VISIT (OUTPATIENT)
Dept: FAMILY MEDICINE CLINIC | Facility: CLINIC | Age: 47
End: 2022-09-12

## 2022-09-12 VITALS
HEIGHT: 63 IN | BODY MASS INDEX: 23.57 KG/M2 | OXYGEN SATURATION: 99 % | HEART RATE: 107 BPM | SYSTOLIC BLOOD PRESSURE: 132 MMHG | DIASTOLIC BLOOD PRESSURE: 89 MMHG | WEIGHT: 133 LBS

## 2022-09-12 DIAGNOSIS — E03.9 HYPOTHYROIDISM, UNSPECIFIED TYPE: Chronic | ICD-10-CM

## 2022-09-12 DIAGNOSIS — M79.7 FIBROMYALGIA: ICD-10-CM

## 2022-09-12 DIAGNOSIS — F41.9 ANXIETY DISORDER, UNSPECIFIED TYPE: ICD-10-CM

## 2022-09-12 DIAGNOSIS — E55.9 VITAMIN D DEFICIENCY: Primary | Chronic | ICD-10-CM

## 2022-09-12 DIAGNOSIS — Z79.890 HORMONE REPLACEMENT THERAPY (HRT): ICD-10-CM

## 2022-09-12 DIAGNOSIS — J98.01 BRONCHOSPASM: ICD-10-CM

## 2022-09-12 DIAGNOSIS — G43.909 MIGRAINE WITHOUT STATUS MIGRAINOSUS, NOT INTRACTABLE, UNSPECIFIED MIGRAINE TYPE: ICD-10-CM

## 2022-09-12 DIAGNOSIS — R19.7 DIARRHEA, UNSPECIFIED TYPE: ICD-10-CM

## 2022-09-12 PROCEDURE — 99214 OFFICE O/P EST MOD 30 MIN: CPT | Performed by: PHYSICIAN ASSISTANT

## 2022-09-12 RX ORDER — LORAZEPAM 1 MG/1
1 TABLET ORAL EVERY 8 HOURS PRN
Qty: 90 TABLET | Refills: 0 | Status: SHIPPED | OUTPATIENT
Start: 2022-09-12 | End: 2022-12-09 | Stop reason: SDUPTHER

## 2022-09-12 RX ORDER — TOPIRAMATE 50 MG/1
50 TABLET, FILM COATED ORAL 2 TIMES DAILY
Qty: 60 TABLET | Refills: 5 | Status: SHIPPED | OUTPATIENT
Start: 2022-09-12 | End: 2023-03-02 | Stop reason: SDUPTHER

## 2022-09-12 RX ORDER — ATOGEPANT 60 MG/1
60 TABLET ORAL DAILY
Qty: 30 TABLET | Refills: 11 | Status: SHIPPED | OUTPATIENT
Start: 2022-09-12 | End: 2022-09-23 | Stop reason: SDUPTHER

## 2022-09-12 RX ORDER — ERGOCALCIFEROL 1.25 MG/1
50000 CAPSULE ORAL WEEKLY
Qty: 13 CAPSULE | Refills: 1 | Status: SHIPPED | OUTPATIENT
Start: 2022-09-12

## 2022-09-12 RX ORDER — ESTRADIOL 0.05 MG/D
1 FILM, EXTENDED RELEASE TRANSDERMAL 2 TIMES WEEKLY
Qty: 24 PATCH | Refills: 1 | Status: SHIPPED | OUTPATIENT
Start: 2022-09-12 | End: 2023-03-02 | Stop reason: SDUPTHER

## 2022-09-12 RX ORDER — LEVOTHYROXINE SODIUM 0.03 MG/1
25 TABLET ORAL DAILY
Qty: 90 TABLET | Refills: 3 | Status: SHIPPED | OUTPATIENT
Start: 2022-09-12

## 2022-09-12 RX ORDER — TRAMADOL HYDROCHLORIDE 50 MG/1
50 TABLET ORAL EVERY 8 HOURS PRN
Qty: 270 TABLET | Refills: 1 | Status: SHIPPED | OUTPATIENT
Start: 2022-09-12

## 2022-09-12 RX ORDER — ALBUTEROL SULFATE 90 UG/1
2 AEROSOL, METERED RESPIRATORY (INHALATION) EVERY 4 HOURS PRN
Qty: 18 G | Refills: 5 | Status: SHIPPED | OUTPATIENT
Start: 2022-09-12

## 2022-09-12 NOTE — PROGRESS NOTES
Chief Complaint  Hypothyroidism (6 month follow up), Anxiety, Vitamin D Deficiency, and Migraine    SUBJECTIVE  Susan Bustos presents to [facility]    History of Present Illness   Pt presents today for 6 month follow up.    Pt states she would like to discuss getting an albuterol inh. Pt states she was given medication years ago w/Gianfranco. Pt states she only takes when she gets coughing and can't stop.    Pt requesting refills on all medications.    No issues or concerns to discuss today.    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   • Fibromyalgia 10/26/2018   • Headache    • Hypothyroidism 03/08/2021   • Insomnia 10/26/2018   • Kidney disease    • Migraine    • Osteoporosis 12/12/2019   • Thyroid disease    • Vitamin D deficiency 12/12/2019      Family History   Problem Relation Age of Onset   • Arthritis Mother    • Stroke Father    • Heart disease Father    • Diabetes Father    • Osteoporosis Father    • Cancer Daughter       Past Surgical History:   Procedure Laterality Date   • CHOLECYSTECTOMY     • CYSTOSCOPY     • HYSTERECTOMY  2010   • KIDNEY SURGERY     • OTHER SURGICAL HISTORY      JOINT SURGERY   • OTHER SURGICAL HISTORY      LITHOTRIPSY        Current Outpatient Medications:   •  aspirin 81 MG chewable tablet, Chew 81 mg Daily., Disp: , Rfl:   •  Atogepant (Qulipta) 60 MG tablet, Take 1 tablet by mouth Daily., Disp: 30 tablet, Rfl: 11  •  estradiol (MINIVELLE, VIVELLE-DOT) 0.05 MG/24HR patch, Place 1 patch on the skin as directed by provider 2 (Two) Times a Week., Disp: 24 patch, Rfl: 1  •  hyoscyamine (LEVSIN) 0.125 MG SL tablet, Take 1 tablet by mouth Every 4 (Four) Hours As Needed for Cramping., Disp: 60 tablet, Rfl: 5  •  levothyroxine (Synthroid) 25 MCG tablet, Take 1 tablet by mouth Daily., Disp: 90 tablet, Rfl:  "3  •  LORazepam (ATIVAN) 1 MG tablet, Take 1 tablet by mouth Every 8 (Eight) Hours As Needed (prn). for anxiety, Disp: 90 tablet, Rfl: 0  •  topiramate (TOPAMAX) 50 MG tablet, Take 1 tablet by mouth 2 (Two) Times a Day., Disp: 60 tablet, Rfl: 5  •  traMADol (ULTRAM) 50 MG tablet, Take 1 tablet by mouth Every 8 (Eight) Hours As Needed (prn)., Disp: 270 tablet, Rfl: 1  •  vitamin D (ERGOCALCIFEROL) 1.25 MG (07922 UT) capsule capsule, Take 1 capsule by mouth 1 (One) Time Per Week., Disp: 13 capsule, Rfl: 1  •  zolpidem (AMBIEN) 10 MG tablet, TAKE 1 TABLET BY MOUTH EVERYDAY AT BEDTIME, Disp: 90 tablet, Rfl: 1  •  albuterol sulfate  (90 Base) MCG/ACT inhaler, Inhale 2 puffs Every 4 (Four) Hours As Needed for Wheezing., Disp: 18 g, Rfl: 5    OBJECTIVE  Vital Signs:   /89 (BP Location: Left arm)   Pulse 107   Ht 160 cm (62.99\")   Wt 60.3 kg (133 lb)   SpO2 99%   BMI 23.57 kg/m²    Estimated body mass index is 23.57 kg/m² as calculated from the following:    Height as of this encounter: 160 cm (62.99\").    Weight as of this encounter: 60.3 kg (133 lb).     Wt Readings from Last 3 Encounters:   09/12/22 60.3 kg (133 lb)   06/29/22 59.9 kg (132 lb)   03/11/22 59.8 kg (131 lb 12.8 oz)     BP Readings from Last 3 Encounters:   09/12/22 132/89   03/11/22 120/84   12/25/21 129/86     Physical Exam  Vitals and nursing note reviewed.   Constitutional:       Appearance: Normal appearance.   HENT:      Head: Normocephalic and atraumatic.   Cardiovascular:      Rate and Rhythm: Normal rate and regular rhythm.      Heart sounds: Normal heart sounds.   Pulmonary:      Effort: Pulmonary effort is normal.      Breath sounds: Normal breath sounds.   Musculoskeletal:      Cervical back: Neck supple.   Neurological:      Mental Status: She is alert.   Psychiatric:         Mood and Affect: Mood normal.         Behavior: Behavior normal.        Result Review    Common labs    Common Labsle 4/5/22 4/5/22 4/5/22    1600 1600 " 1600   Glucose  85    BUN  5 (A)    Creatinine  0.94    Sodium  140    Potassium  3.5    Chloride  101    Calcium  9.0    Albumin  4.40    Total Bilirubin  0.4    Alkaline Phosphatase  101    AST (SGOT)  16    ALT (SGPT)  10    WBC 7.28     Hemoglobin 15.5     Hematocrit 46.7 (A)     Platelets 281     Total Cholesterol   220 (A)   Triglycerides   115   HDL Cholesterol   75 (A)   LDL Cholesterol    125 (A)   (A) Abnormal value            Mammo Screening Digital Tomosynthesis Bilateral With CAD    Result Date: 5/19/2022   Benign mammogram. Suggest routine mammographic screening.  RECOMMENDATION(S):  ROUTINE MAMMOGRAM AND CLINICAL EVALUATION IN 12 MONTHS.   BIRADS:  DIAGNOSTIC CATEGORY 2--BENIGN FINDING   BREAST COMPOSITION: Heterogeneously dense,which may obscure small masses.  PLEASE NOTE:  A NORMAL MAMMOGRAM DOES NOT EXCLUDE THE POSSIBILITY OF BREAST CANCER. ANY CLINICALLY SUSPICIOUS PALPABLE LUMP SHOULD BE BIOPSIED.      ESTELLE BLANDON MD       Electronically Signed and Approved By: ESTELLE BLANDON MD on 5/19/2022 at 7:36               The above data has been reviewed by REYNA Gama 09/12/2022 07:20 EDT.          Patient Care Team:  Jarrod Thomas PA as PCP - General (Physician Assistant)    BMI is within normal parameters. No other follow-up for BMI required.     ASSESSMENT & PLAN    Diagnoses and all orders for this visit:    1. Vitamin D deficiency (Primary)  Comments:  Cont Vit D , stable  Orders:  -     vitamin D (ERGOCALCIFEROL) 1.25 MG (46972 UT) capsule capsule; Take 1 capsule by mouth 1 (One) Time Per Week.  Dispense: 13 capsule; Refill: 1    2. Migraine without status migrainosus, not intractable, unspecified migraine type  -     Atogepant (Qulipta) 60 MG tablet; Take 1 tablet by mouth Daily.  Dispense: 30 tablet; Refill: 11  -     topiramate (TOPAMAX) 50 MG tablet; Take 1 tablet by mouth 2 (Two) Times a Day.  Dispense: 60 tablet; Refill: 5  -     traMADol (ULTRAM) 50 MG tablet; Take 1 tablet by mouth  Every 8 (Eight) Hours As Needed (prn).  Dispense: 270 tablet; Refill: 1    3. Diarrhea, unspecified type  -     hyoscyamine (LEVSIN) 0.125 MG SL tablet; Take 1 tablet by mouth Every 4 (Four) Hours As Needed for Cramping.  Dispense: 60 tablet; Refill: 5    4. Anxiety disorder, unspecified type  -     LORazepam (ATIVAN) 1 MG tablet; Take 1 tablet by mouth Every 8 (Eight) Hours As Needed (prn). for anxiety  Dispense: 90 tablet; Refill: 0    5. Fibromyalgia  -     traMADol (ULTRAM) 50 MG tablet; Take 1 tablet by mouth Every 8 (Eight) Hours As Needed (prn).  Dispense: 270 tablet; Refill: 1    6. Hypothyroidism, unspecified type  Comments:  Continue Synthroid 25mcg daily, stable  Overview:  Stable on synthroid 25mcg daily    Orders:  -     levothyroxine (Synthroid) 25 MCG tablet; Take 1 tablet by mouth Daily.  Dispense: 90 tablet; Refill: 3    7. Hormone replacement therapy (HRT)  -     estradiol (MINIVELLE, VIVELLE-DOT) 0.05 MG/24HR patch; Place 1 patch on the skin as directed by provider 2 (Two) Times a Week.  Dispense: 24 patch; Refill: 1    8. Bronchospasm  -     albuterol sulfate  (90 Base) MCG/ACT inhaler; Inhale 2 puffs Every 4 (Four) Hours As Needed for Wheezing.  Dispense: 18 g; Refill: 5     Tobacco Use: Low Risk    • Smoking Tobacco Use: Never Smoker   • Smokeless Tobacco Use: Never Used     Follow Up     Return in about 4 months (around 1/12/2023).    Patient was given instructions and counseling regarding her condition or for health maintenance advice. Please see specific information pulled into the AVS if appropriate.   I have reviewed information obtained and documented by others and I have confirmed the accuracy of this documented note.    REYNA Gama

## 2022-09-23 ENCOUNTER — TELEPHONE (OUTPATIENT)
Dept: FAMILY MEDICINE CLINIC | Facility: CLINIC | Age: 47
End: 2022-09-23

## 2022-09-23 DIAGNOSIS — G43.909 MIGRAINE WITHOUT STATUS MIGRAINOSUS, NOT INTRACTABLE, UNSPECIFIED MIGRAINE TYPE: ICD-10-CM

## 2022-09-23 RX ORDER — ATOGEPANT 60 MG/1
60 TABLET ORAL DAILY
Qty: 30 TABLET | Refills: 11 | Status: SHIPPED | OUTPATIENT
Start: 2022-09-23 | End: 2023-04-03 | Stop reason: SDUPTHER

## 2022-10-12 DIAGNOSIS — G47.00 INSOMNIA, UNSPECIFIED TYPE: ICD-10-CM

## 2022-10-13 RX ORDER — ZOLPIDEM TARTRATE 10 MG/1
TABLET ORAL
Qty: 90 TABLET | Refills: 1 | OUTPATIENT
Start: 2022-10-13

## 2022-12-09 ENCOUNTER — OFFICE VISIT (OUTPATIENT)
Dept: INTERNAL MEDICINE | Facility: CLINIC | Age: 47
End: 2022-12-09

## 2022-12-09 ENCOUNTER — PATIENT MESSAGE (OUTPATIENT)
Dept: INTERNAL MEDICINE | Facility: CLINIC | Age: 47
End: 2022-12-09

## 2022-12-09 VITALS
TEMPERATURE: 97.4 F | BODY MASS INDEX: 21.86 KG/M2 | WEIGHT: 118.8 LBS | SYSTOLIC BLOOD PRESSURE: 142 MMHG | DIASTOLIC BLOOD PRESSURE: 90 MMHG | HEART RATE: 95 BPM | HEIGHT: 62 IN | OXYGEN SATURATION: 98 %

## 2022-12-09 DIAGNOSIS — G47.00 INSOMNIA, UNSPECIFIED TYPE: ICD-10-CM

## 2022-12-09 DIAGNOSIS — M79.7 FIBROMYALGIA: ICD-10-CM

## 2022-12-09 DIAGNOSIS — R73.9 HYPERGLYCEMIA: ICD-10-CM

## 2022-12-09 DIAGNOSIS — E03.9 HYPOTHYROIDISM, UNSPECIFIED TYPE: Primary | ICD-10-CM

## 2022-12-09 DIAGNOSIS — Z79.899 ENCOUNTER FOR LONG-TERM (CURRENT) USE OF HIGH-RISK MEDICATION: ICD-10-CM

## 2022-12-09 DIAGNOSIS — Z11.59 NEED FOR HEPATITIS C SCREENING TEST: ICD-10-CM

## 2022-12-09 DIAGNOSIS — M81.0 OSTEOPOROSIS WITHOUT CURRENT PATHOLOGICAL FRACTURE, UNSPECIFIED OSTEOPOROSIS TYPE: ICD-10-CM

## 2022-12-09 DIAGNOSIS — G43.009 MIGRAINE WITHOUT AURA AND WITHOUT STATUS MIGRAINOSUS, NOT INTRACTABLE: ICD-10-CM

## 2022-12-09 DIAGNOSIS — E78.49 OTHER HYPERLIPIDEMIA: ICD-10-CM

## 2022-12-09 DIAGNOSIS — E55.9 VITAMIN D DEFICIENCY: ICD-10-CM

## 2022-12-09 DIAGNOSIS — F41.9 ANXIETY DISORDER, UNSPECIFIED TYPE: ICD-10-CM

## 2022-12-09 DIAGNOSIS — K83.4 SPHINCTER OF ODDI DYSFUNCTION: ICD-10-CM

## 2022-12-09 PROBLEM — N20.0 NEPHROLITHIASIS: Status: ACTIVE | Noted: 2022-12-09

## 2022-12-09 LAB
25(OH)D3 SERPL-MCNC: 28.6 NG/ML (ref 30–100)
ALBUMIN SERPL-MCNC: 4.5 G/DL (ref 3.5–5.2)
ALBUMIN/GLOB SERPL: 1.8 G/DL
ALP SERPL-CCNC: 90 U/L (ref 39–117)
ALT SERPL W P-5'-P-CCNC: 7 U/L (ref 1–33)
AMPHET+METHAMPHET UR QL: NEGATIVE
AMPHETAMINE INTERNAL CONTROL: ABNORMAL
AMPHETAMINES UR QL: NEGATIVE
ANION GAP SERPL CALCULATED.3IONS-SCNC: 11.2 MMOL/L (ref 5–15)
AST SERPL-CCNC: 13 U/L (ref 1–32)
BARBITURATE INTERNAL CONTROL: ABNORMAL
BARBITURATES UR QL SCN: NEGATIVE
BASOPHILS # BLD AUTO: 0.05 10*3/MM3 (ref 0–0.2)
BASOPHILS NFR BLD AUTO: 0.9 % (ref 0–1.5)
BENZODIAZ UR QL SCN: POSITIVE
BENZODIAZEPINE INTERNAL CONTROL: ABNORMAL
BILIRUB SERPL-MCNC: 0.3 MG/DL (ref 0–1.2)
BUN SERPL-MCNC: 8 MG/DL (ref 6–20)
BUN/CREAT SERPL: 7.2 (ref 7–25)
BUPRENORPHINE INTERNAL CONTROL: ABNORMAL
BUPRENORPHINE SERPL-MCNC: NEGATIVE NG/ML
CALCIUM SPEC-SCNC: 9.1 MG/DL (ref 8.6–10.5)
CANNABINOIDS SERPL QL: NEGATIVE
CHLORIDE SERPL-SCNC: 104 MMOL/L (ref 98–107)
CHOLEST SERPL-MCNC: 228 MG/DL (ref 0–200)
CO2 SERPL-SCNC: 24.8 MMOL/L (ref 22–29)
COCAINE INTERNAL CONTROL: ABNORMAL
COCAINE UR QL: NEGATIVE
CREAT SERPL-MCNC: 1.11 MG/DL (ref 0.57–1)
DEPRECATED RDW RBC AUTO: 42.4 FL (ref 37–54)
EGFRCR SERPLBLD CKD-EPI 2021: 61.1 ML/MIN/1.73
EOSINOPHIL # BLD AUTO: 0.34 10*3/MM3 (ref 0–0.4)
EOSINOPHIL NFR BLD AUTO: 6.2 % (ref 0.3–6.2)
ERYTHROCYTE [DISTWIDTH] IN BLOOD BY AUTOMATED COUNT: 12.1 % (ref 12.3–15.4)
EXPIRATION DATE: ABNORMAL
GLOBULIN UR ELPH-MCNC: 2.5 GM/DL
GLUCOSE SERPL-MCNC: 99 MG/DL (ref 65–99)
HBA1C MFR BLD: 5.2 % (ref 4.8–5.6)
HCT VFR BLD AUTO: 44.9 % (ref 34–46.6)
HDLC SERPL-MCNC: 68 MG/DL (ref 40–60)
HGB BLD-MCNC: 15.2 G/DL (ref 12–15.9)
IMM GRANULOCYTES # BLD AUTO: 0.01 10*3/MM3 (ref 0–0.05)
IMM GRANULOCYTES NFR BLD AUTO: 0.2 % (ref 0–0.5)
LDLC SERPL CALC-MCNC: 144 MG/DL (ref 0–100)
LDLC/HDLC SERPL: 2.09 {RATIO}
LYMPHOCYTES # BLD AUTO: 1.16 10*3/MM3 (ref 0.7–3.1)
LYMPHOCYTES NFR BLD AUTO: 21.2 % (ref 19.6–45.3)
Lab: ABNORMAL
MCH RBC QN AUTO: 32.3 PG (ref 26.6–33)
MCHC RBC AUTO-ENTMCNC: 33.9 G/DL (ref 31.5–35.7)
MCV RBC AUTO: 95.3 FL (ref 79–97)
MDMA (ECSTASY) INTERNAL CONTROL: ABNORMAL
MDMA UR QL SCN: NEGATIVE
METHADONE INTERNAL CONTROL: ABNORMAL
METHADONE UR QL SCN: NEGATIVE
METHAMPHETAMINE INTERNAL CONTROL: ABNORMAL
MONOCYTES # BLD AUTO: 0.77 10*3/MM3 (ref 0.1–0.9)
MONOCYTES NFR BLD AUTO: 14.1 % (ref 5–12)
NEUTROPHILS NFR BLD AUTO: 3.14 10*3/MM3 (ref 1.7–7)
NEUTROPHILS NFR BLD AUTO: 57.4 % (ref 42.7–76)
NRBC BLD AUTO-RTO: 0 /100 WBC (ref 0–0.2)
OPIATES INTERNAL CONTROL: ABNORMAL
OPIATES UR QL: NEGATIVE
OXYCODONE INTERNAL CONTROL: ABNORMAL
OXYCODONE UR QL SCN: NEGATIVE
PCP UR QL SCN: NEGATIVE
PHENCYCLIDINE INTERNAL CONTROL: ABNORMAL
PLATELET # BLD AUTO: 252 10*3/MM3 (ref 140–450)
PMV BLD AUTO: 9.6 FL (ref 6–12)
POTASSIUM SERPL-SCNC: 3.8 MMOL/L (ref 3.5–5.2)
PROT SERPL-MCNC: 7 G/DL (ref 6–8.5)
RBC # BLD AUTO: 4.71 10*6/MM3 (ref 3.77–5.28)
SODIUM SERPL-SCNC: 140 MMOL/L (ref 136–145)
THC INTERNAL CONTROL: ABNORMAL
TRIGL SERPL-MCNC: 91 MG/DL (ref 0–150)
TSH SERPL DL<=0.05 MIU/L-ACNC: 2.2 UIU/ML (ref 0.27–4.2)
VLDLC SERPL-MCNC: 16 MG/DL (ref 5–40)
WBC NRBC COR # BLD: 5.47 10*3/MM3 (ref 3.4–10.8)

## 2022-12-09 PROCEDURE — 80305 DRUG TEST PRSMV DIR OPT OBS: CPT | Performed by: INTERNAL MEDICINE

## 2022-12-09 PROCEDURE — 80061 LIPID PANEL: CPT | Performed by: INTERNAL MEDICINE

## 2022-12-09 PROCEDURE — 99215 OFFICE O/P EST HI 40 MIN: CPT | Performed by: INTERNAL MEDICINE

## 2022-12-09 PROCEDURE — 86803 HEPATITIS C AB TEST: CPT | Performed by: INTERNAL MEDICINE

## 2022-12-09 PROCEDURE — 82306 VITAMIN D 25 HYDROXY: CPT | Performed by: INTERNAL MEDICINE

## 2022-12-09 PROCEDURE — 80050 GENERAL HEALTH PANEL: CPT | Performed by: INTERNAL MEDICINE

## 2022-12-09 PROCEDURE — 83036 HEMOGLOBIN GLYCOSYLATED A1C: CPT | Performed by: INTERNAL MEDICINE

## 2022-12-09 RX ORDER — BENZONATATE 200 MG/1
200 CAPSULE ORAL 3 TIMES DAILY PRN
Qty: 60 CAPSULE | Refills: 0 | Status: SHIPPED | OUTPATIENT
Start: 2022-12-09 | End: 2023-01-25

## 2022-12-09 RX ORDER — LORAZEPAM 1 MG/1
1 TABLET ORAL EVERY 8 HOURS PRN
Qty: 90 TABLET | Refills: 0 | Status: SHIPPED | OUTPATIENT
Start: 2022-12-09 | End: 2023-01-05

## 2022-12-09 RX ORDER — UBROGEPANT 100 MG/1
100 TABLET ORAL SEE ADMIN INSTRUCTIONS
COMMUNITY
Start: 2022-09-30 | End: 2023-03-02 | Stop reason: SDUPTHER

## 2022-12-09 NOTE — TELEPHONE ENCOUNTER
From: Susan Bustos  To: Flaco Lang Jr, MD  Sent: 12/9/2022 12:55 PM EST  Subject: Meds    I was just checking to see if the Tesalon pearls and Ativan had been sent to [facility] yet sorry to bother you I was just heading home. Thanks again for seeing me today! ~

## 2022-12-09 NOTE — PROGRESS NOTES
Chief Complaint  Establish Care (Previous PCP /Jarrod neely ), Nasal Congestion, Cough (Draining /2 days ago /States that lisha has listened too her lungs ()), and Fatigue    Subjective          Susan Bustos presents to [facility]  History of Present Illness  Previous PCP:  Jarrod Neely   Specialist(s): Derm- Kevon Sierra, colonoscopy- April mylene   COVID vaccine:UTD   Pneumonia vaccine: n/a  Shingles vaccine: N/a  Colon cancer screening: Blood test last month. Patient with guardianshield test    Mammogram: 5/18/2022  Pap Smear: long time ago   DEXA/Bone Density: 10/29/2021    Patient takes levsin for sphincter of oddi spasms since having cholecystectomy. Patient reports levsin has helped.   Patient with factor 2 deficiency as diagnosed by [employer] testing.   Osteoporosis- patient has not done fosamax or prolia previously.   Fibromyalgia- patient is on ultram to help. Patient previously seen by pain specialist and was on several different pain medications. Patient reports previously on SSRI with SEs including headaches. Patient previously on lyrica with SEs. Patient not on gabapentin however. Patient also tried TCAs previously, but did not find them effective.   Migraine - patient is on quilipta and daily and ulbrevy prn. Patient has been on estrogen since 36yo. Patient trialed off topamax, but migraines returned.   Insomnia- patient is on ambien to help with sleep as need when going on trips.   Anxiety - patient is on ativan prn for anxiety and takes it twice per day for approx 3 years. Patient reports trying buspar several years ago. Patient does not recall being on klonopin.       Current Outpatient Medications   Medication Instructions   • albuterol sulfate  (90 Base) MCG/ACT inhaler 2 puffs, Inhalation, Every 4 Hours PRN   • aspirin 81 mg, Oral, Daily   • estradiol (MINIVELLE, VIVELLE-DOT) 0.05 MG/24HR patch 1 patch, Transdermal, 2 Times Weekly   •  "hyoscyamine (LEVSIN) 0.125 mg, Oral, Every 4 Hours PRN   • levothyroxine (SYNTHROID) 25 mcg, Oral, Daily   • LORazepam (ATIVAN) 1 mg, Oral, Every 8 Hours PRN, for anxiety   • Qulipta 60 mg, Oral, Daily   • topiramate (TOPAMAX) 50 mg, Oral, 2 Times Daily   • traMADol (ULTRAM) 50 mg, Oral, Every 8 Hours PRN   • ubrogepant 100 mg, Oral, See Admin Instructions   • vitamin D (ERGOCALCIFEROL) 50,000 Units, Oral, Weekly   • zolpidem (AMBIEN) 10 MG tablet TAKE 1 TABLET BY MOUTH EVERYDAY AT BEDTIME       The following portions of the patient's history were reviewed and updated as appropriate: allergies, current medications, past family history, past medical history, past social history, past surgical history, and problem list.    Objective   Vital Signs:   /90 (BP Location: Left arm)   Pulse 95   Temp 97.4 °F (36.3 °C) (Temporal)   Ht 157.5 cm (62\")   Wt 53.9 kg (118 lb 12.8 oz)   SpO2 98%   BMI 21.73 kg/m²     Wt Readings from Last 3 Encounters:   12/09/22 53.9 kg (118 lb 12.8 oz)   09/12/22 60.3 kg (133 lb)   06/29/22 59.9 kg (132 lb)     BP Readings from Last 3 Encounters:   12/09/22 142/90   09/12/22 132/89   03/11/22 120/84     Physical Exam   Appearance: No acute distress, well-nourished  Head: normocephalic, atraumatic  Eyes: extraocular movements intact, no scleral icterus, no conjunctival injection  Ears, Nose, and Throat: external ears normal, nares patent, moist mucous membranes  Cardiovascular: regular rate and rhythm. no murmurs, rubs, or gallops. no edema  Respiratory: breathing comfortably, symmetric chest rise, clear to auscultation bilaterally. No wheezes, rales, or rhonchi.  Neuro: alert and oriented to time, place, and person. Normal gait  Psych: normal mood and affect     Result Review :   The following data was reviewed by: Flaco Lang Jr, MD on 12/09/2022:  Common labs    Common Labs 4/5/22 4/5/22 4/5/22    1600 1600 1600   Glucose  85    BUN  5 (A)    Creatinine  0.94    Sodium  " 140    Potassium  3.5    Chloride  101    Calcium  9.0    Albumin  4.40    Total Bilirubin  0.4    Alkaline Phosphatase  101    AST (SGOT)  16    ALT (SGPT)  10    WBC 7.28     Hemoglobin 15.5     Hematocrit 46.7 (A)     Platelets 281     Total Cholesterol   220 (A)   Triglycerides   115   HDL Cholesterol   75 (A)   LDL Cholesterol    125 (A)   (A) Abnormal value              Lab Results   Component Value Date    BILIRUBINUR Negative 04/05/2022     Last Urine Toxicity     LAST URINE TOXICITY RESULTS Latest Ref Rng & Units 12/9/2022 3/11/2022    AMPHETAMINES SCREEN, URINE Negative Negative Negative    BARBITURATES SCREEN Negative Negative Negative    BENZODIAZEPINE SCREEN, URINE Negative Positive(A) Positive(A)    BUPRENORPHINEUR Negative Negative Negative    COCAINE SCREEN, URINE Negative Negative Negative    METHADONE SCREEN, URINE Negative Negative Negative    METHAMPHETAMINEUR Negative Negative Negative          Assessment and Plan    Diagnoses and all orders for this visit:    1. Hypothyroidism, unspecified type (Primary)  -     TSH    2. Vitamin D deficiency  -     Vitamin D,25-Hydroxy    3. Anxiety disorder, unspecified type  Comments:  cont ativan prn. discussed switch to longer acting klonopin. UDS and bernarda reviewed. discussed possible use of wellbutrin in future.     4. Migraine without aura and without status migrainosus, not intractable  Comments:  cont topamax and quilipta as preventative. ubrelvy prn.   Orders:  -     CBC & Differential    5. Insomnia, unspecified type  Comments:  cont mabien prn. discussed weaning regimen. UDS and bernarda reviewed.     6. Other hyperlipidemia  -     Lipid Panel  -     Comprehensive Metabolic Panel    7. Need for hepatitis C screening test  -     HCV Antibody Rfx To Qnt PCR    8. Sphincter of Oddi dysfunction  Comments:  take levsin prn.     9. Hyperglycemia  -     Hemoglobin A1c    10. Encounter for long-term (current) use of high-risk medication  -     POC Urine Drug  Screen [facility] [employer]    11. Osteoporosis without current pathological fracture, unspecified osteoporosis type  Comments:  discussed use of prolia. defers use of fosamax.     12. Fibromyalgia  Comments:  tramdol prn pain. pt reports trying several meds previouslyin including cymbalta, lyrica, and TCAs.         There are no discontinued medications.     I spent 46 minutes caring for  on this date of service. This time includes time spent by me in the following activities:preparing for the visit, reviewing tests, performing a medically appropriate examination and/or evaluation , counseling and educating the patient/family/caregiver, ordering medications, tests, or procedures, documenting information in the medical record and independently interpreting results and communicating that information with the patient/family/caregiver     Follow Up   Return in about 6 months (around 6/9/2023).  Patient was given instructions and counseling regarding her condition or for health maintenance advice. Please see specific information pulled into the AVS if appropriate.       Flaco Lang Jr, MD  12/09/22  11:48 EST

## 2022-12-11 LAB
HCV AB S/CO SERPL IA: <0.1 S/CO RATIO (ref 0–0.9)
HCV AB SERPL QL IA: NORMAL

## 2023-01-05 ENCOUNTER — TELEPHONE (OUTPATIENT)
Dept: INTERNAL MEDICINE | Facility: CLINIC | Age: 48
End: 2023-01-05
Payer: COMMERCIAL

## 2023-01-05 DIAGNOSIS — F41.9 ANXIETY DISORDER, UNSPECIFIED TYPE: ICD-10-CM

## 2023-01-05 RX ORDER — LORAZEPAM 1 MG/1
1 TABLET ORAL EVERY 8 HOURS PRN
Qty: 90 TABLET | Refills: 0 | Status: SHIPPED | OUTPATIENT
Start: 2023-01-05 | End: 2023-03-22 | Stop reason: SDUPTHER

## 2023-01-05 NOTE — TELEPHONE ENCOUNTER
Control Refill Request:  Medication:  Last Office Visit: 12/09/2022  Next Office Visit: 02/08/2023  Last UDS: 12/09/2022  Last Contract: 12/09/2022

## 2023-01-25 DIAGNOSIS — G47.00 INSOMNIA, UNSPECIFIED TYPE: ICD-10-CM

## 2023-01-25 RX ORDER — ZOLPIDEM TARTRATE 10 MG/1
10 TABLET ORAL
Qty: 90 TABLET | Refills: 1 | Status: SHIPPED | OUTPATIENT
Start: 2023-01-25

## 2023-01-25 RX ORDER — BENZONATATE 200 MG/1
CAPSULE ORAL
Qty: 60 CAPSULE | Refills: 0 | Status: SHIPPED | OUTPATIENT
Start: 2023-01-25 | End: 2023-02-08

## 2023-01-25 NOTE — TELEPHONE ENCOUNTER
Rx Refill Note  Requested Prescriptions     Pending Prescriptions Disp Refills   • benzonatate (TESSALON) 200 MG capsule [Pharmacy Med Name: BENZONATATE 200 MG CAPSULE] 60 capsule 0     Sig: TAKE 1 CAPSULE BY MOUTH 3 TIMES A DAY AS NEEDED FOR COUGH.   • zolpidem (AMBIEN) 10 MG tablet 90 tablet 1     Sig: Take 1 tablet by mouth every night at bedtime.      Last office visit with prescribing clinician: 12/9/2022   Last telemedicine visit with prescribing clinician: 2/8/2023   Next office visit with prescribing clinician: Visit date not found                         Would you like a call back once the refill request has been completed: [] Yes [] No    If the office needs to give you a call back, can they leave a voicemail: [] Yes [] No    Gonzalez Adamson  01/25/23, 15:19 EST

## 2023-01-25 NOTE — TELEPHONE ENCOUNTER
Refill for short term med:    Tessalon Pearles  Last filled: 12/9/22 #60-no refills    PLEASE ADVISE

## 2023-02-08 ENCOUNTER — OFFICE VISIT (OUTPATIENT)
Dept: INTERNAL MEDICINE | Facility: CLINIC | Age: 48
End: 2023-02-08
Payer: COMMERCIAL

## 2023-02-08 VITALS
TEMPERATURE: 98 F | HEIGHT: 62 IN | SYSTOLIC BLOOD PRESSURE: 128 MMHG | HEART RATE: 103 BPM | WEIGHT: 114 LBS | DIASTOLIC BLOOD PRESSURE: 89 MMHG | BODY MASS INDEX: 20.98 KG/M2 | OXYGEN SATURATION: 98 %

## 2023-02-08 DIAGNOSIS — Z12.72 ENCOUNTER FOR FOLLOW-UP VAGINAL PAPANICOLAOU SMEAR: Primary | ICD-10-CM

## 2023-02-08 DIAGNOSIS — Z00.00 ANNUAL PHYSICAL EXAM: ICD-10-CM

## 2023-02-08 DIAGNOSIS — F41.9 ANXIETY DISORDER, UNSPECIFIED TYPE: ICD-10-CM

## 2023-02-08 DIAGNOSIS — G43.809 OTHER MIGRAINE WITHOUT STATUS MIGRAINOSUS, NOT INTRACTABLE: ICD-10-CM

## 2023-02-08 PROCEDURE — 99214 OFFICE O/P EST MOD 30 MIN: CPT | Performed by: NURSE PRACTITIONER

## 2023-02-08 PROCEDURE — G0123 SCREEN CERV/VAG THIN LAYER: HCPCS | Performed by: NURSE PRACTITIONER

## 2023-02-08 NOTE — PROGRESS NOTES
Chief Complaint  Gynecologic Exam and Med Refill (Wanting to discuss the changing of Ativan to Klonopin, needing Nurtec sent in so Ubrelvy can get covered)    Subjective          Susan Bustos presents to [facility]  Anxiety  Presents for follow-up visit. Symptoms include nervous/anxious behavior. Patient reports no chest pain, compulsions, confusion, decreased concentration, depressed mood, dizziness, dry mouth, excessive worry, feeling of choking, hyperventilation, impotence, insomnia, irritability, malaise, muscle tension, nausea, obsessions, palpitations, panic, restlessness, shortness of breath or suicidal ideas. The severity of symptoms is mild.     Compliance with medications is %.   Migraine  Headache pattern:  Headache sometimes there, sometimes not at all  Patient reports that Ubrelvy is very helpful with her headaches but insurance will not approve unless she trials Nurtec.       Menstrual History  Age of menarche: around 9 or 10 years old  Cycle length: n/a   Flow: (light, medium, heavy): n/a  LMP date: had hysterectomy 13 years ago; complete; no cervix.   Post menopausal?: no  Age of menopause: n/a     Gynecologic History:  Self breast exams: yes  Recent Mammogram: 5/18/2022;   Had abnormal Mammo and had biopsy, benign. Has had two normal mammograms since the biopsy.  Previous GYN surgery: total hysterectomy, including cervix  History of infertility: no  Last PAP smear: does not know when last PAP was, 5+ years ago  Have you ever had an abnormal PAP?: yes    Contraceptive/Sexual history:   Current contraceptive method?: none, hysterectomy   Sexually active?: yes  Number of partners?: 1  New partner within the last 3 months?: no  Condom use?: no  History of STDs? no  Painful intercourse?: no      Current Outpatient Medications   Medication Instructions   • albuterol sulfate  (90 Base) MCG/ACT inhaler 2 puffs, Inhalation, Every 4 Hours PRN   •  "aspirin 81 mg, Oral, Daily   • clonazePAM (KLONOPIN) 1 mg, Oral, 2 Times Daily PRN   • estradiol (MINIVELLE, VIVELLE-DOT) 0.05 MG/24HR patch 1 patch, Transdermal, 2 Times Weekly   • hyoscyamine (LEVSIN) 0.125 mg, Oral, Every 4 Hours PRN   • levothyroxine (SYNTHROID) 25 mcg, Oral, Daily   • LORazepam (ATIVAN) 1 mg, Oral, Every 8 Hours PRN, for anxiety   • Qulipta 60 mg, Oral, Daily   • Rimegepant Sulfate (NURTEC) 75 mg, Oral, Every 48 Hours PRN   • topiramate (TOPAMAX) 50 mg, Oral, 2 Times Daily   • traMADol (ULTRAM) 50 mg, Oral, Every 8 Hours PRN   • Ubrelvy 100 mg, Oral, See Admin Instructions   • vitamin D (ERGOCALCIFEROL) 50,000 Units, Oral, Weekly   • zolpidem (AMBIEN) 10 mg, Oral, Every Night at Bedtime       The following portions of the patient's history were reviewed and updated as appropriate: allergies, current medications, past family history, past medical history, past social history, past surgical history, and problem list.    Objective   Vital Signs:   /89 (BP Location: Right arm, Patient Position: Sitting, Cuff Size: Adult)   Pulse 103   Temp 98 °F (36.7 °C) (Temporal)   Ht 157.5 cm (62\")   Wt 51.7 kg (114 lb)   SpO2 98%   BMI 20.85 kg/m²     Wt Readings from Last 3 Encounters:   02/08/23 51.7 kg (114 lb)   12/09/22 53.9 kg (118 lb 12.8 oz)   09/12/22 60.3 kg (133 lb)     BP Readings from Last 3 Encounters:   02/08/23 128/89   12/09/22 142/90   09/12/22 132/89     Physical Exam  Vitals and nursing note reviewed. Exam conducted with a chaperone present (SOM Bender).   Constitutional:       Appearance: Normal appearance.   Neck:      Thyroid: No thyroid mass or thyromegaly.   Pulmonary:      Effort: Pulmonary effort is normal.   Chest:   Breasts:     Right: Normal. No swelling, bleeding, inverted nipple, mass, nipple discharge, skin change or tenderness.      Left: Normal. No swelling, bleeding, inverted nipple, mass, nipple discharge, skin change or tenderness.      Comments: Dense breast " tissue noted bilaterally; right > left   Genitourinary:     General: Normal vulva.      Exam position: Lithotomy position.      Vagina: Normal.      Cervix: Normal.      Uterus: Normal.       Adnexa: Right adnexa normal and left adnexa normal.   Musculoskeletal:      Cervical back: Full passive range of motion without pain.   Lymphadenopathy:      Upper Body:      Right upper body: No axillary or pectoral adenopathy.      Left upper body: No axillary or pectoral adenopathy.   Skin:     Capillary Refill: Capillary refill takes less than 2 seconds.   Neurological:      Mental Status: She is alert and oriented to person, place, and time. Mental status is at baseline.   Psychiatric:         Mood and Affect: Mood normal.         Behavior: Behavior normal.         Thought Content: Thought content normal.         Judgment: Judgment normal.            Result Review :   The following data was reviewed by: ROZINA Garcia on 02/08/2023:  Common labs    Common Labs 4/5/22 4/5/22 4/5/22 12/9/22 12/9/22 12/9/22 12/9/22    1600 1600 1600 1023 1023 1023 1023   Glucose  85     99   BUN  5 (A)     8   Creatinine  0.94     1.11 (A)   Sodium  140     140   Potassium  3.5     3.8   Chloride  101     104   Calcium  9.0     9.1   Albumin  4.40     4.50   Total Bilirubin  0.4     0.3   Alkaline Phosphatase  101     90   AST (SGOT)  16     13   ALT (SGPT)  10     7   WBC 7.28    5.47     Hemoglobin 15.5    15.2     Hematocrit 46.7 (A)    44.9     Platelets 281    252     Total Cholesterol   220 (A)   228 (A)    Triglycerides   115   91    HDL Cholesterol   75 (A)   68 (A)    LDL Cholesterol    125 (A)   144 (A)    Hemoglobin A1C    5.20      (A) Abnormal value                   Lab Results   Component Value Date    BILIRUBINUR Negative 04/05/2022       Procedures        Assessment and Plan    Diagnoses and all orders for this visit:    1. Encounter for follow-up vaginal Papanicolaou smear (Primary)  -     Cancel: IGP,rfx Aptima  HPV All Pth  -     Mammo Screening Digital Tomosynthesis Bilateral With CAD; Future  -     IGP,rfx Aptima HPV All Pth  -     PDF Report    2. Anxiety disorder, unspecified type  Assessment & Plan:  Psychological condition is stable .  Continue current treatment regimen.  Psychological condition  will be reassessed in 3 months.    Orders:  -     clonazePAM (KlonoPIN) 1 MG tablet; Take 1 tablet by mouth 2 (Two) Times a Day As Needed for Anxiety.  Dispense: 60 tablet; Refill: 0    3. Other migraine without status migrainosus, not intractable  Assessment & Plan:  Headaches are unchanged.  Medication changes per orders.        Orders:  -     Rimegepant Sulfate (NURTEC) 75 MG tablet dispersible tablet; Take 1 tablet by mouth Every Other Day As Needed (migrane).  Dispense: 16 tablet; Refill: 0    4. Annual physical exam      Advised on diet, physical activity, sunscreen, helmet, texting and driving, etc    Medications Discontinued During This Encounter   Medication Reason   • benzonatate (TESSALON) 200 MG capsule *Therapy completed          Follow Up   Return in about 3 months (around 5/8/2023) for Follow-up with Dr. Lang.  Patient was given instructions and counseling regarding her condition or for health maintenance advice. Please see specific information pulled into the AVS if appropriate.       Vivi Martinez, APRN  03/14/23  11:41 EDT

## 2023-02-09 RX ORDER — CLONAZEPAM 1 MG/1
1 TABLET ORAL 2 TIMES DAILY PRN
Qty: 60 TABLET | Refills: 0 | Status: SHIPPED | OUTPATIENT
Start: 2023-02-09

## 2023-02-09 NOTE — ASSESSMENT & PLAN NOTE
Psychological condition is stable .  Continue current treatment regimen.  Psychological condition  will be reassessed in 3 months.

## 2023-02-13 ENCOUNTER — PRIOR AUTHORIZATION (OUTPATIENT)
Dept: INTERNAL MEDICINE | Facility: CLINIC | Age: 48
End: 2023-02-13
Payer: COMMERCIAL

## 2023-02-13 NOTE — TELEPHONE ENCOUNTER
PA REQUIRED FOR FOLLOWING MEDICATION:    Rimegepant Sulfate (NURTEC) 75 MG tablet dispersible tablet (02/09/2023)    INDEXED VIA ONBASE

## 2023-02-15 LAB
CONV .: NORMAL
CYTOLOGIST CVX/VAG CYTO: NORMAL
CYTOLOGY CVX/VAG DOC CYTO: NORMAL
CYTOLOGY CVX/VAG DOC THIN PREP: NORMAL
DX ICD CODE: NORMAL
HIV 1 & 2 AB SER-IMP: NORMAL
OTHER STN SPEC: NORMAL
STAT OF ADQ CVX/VAG CYTO-IMP: NORMAL

## 2023-02-28 ENCOUNTER — PATIENT MESSAGE (OUTPATIENT)
Dept: INTERNAL MEDICINE | Facility: CLINIC | Age: 48
End: 2023-02-28
Payer: COMMERCIAL

## 2023-02-28 DIAGNOSIS — G43.909 MIGRAINE WITHOUT STATUS MIGRAINOSUS, NOT INTRACTABLE, UNSPECIFIED MIGRAINE TYPE: ICD-10-CM

## 2023-02-28 DIAGNOSIS — Z79.890 HORMONE REPLACEMENT THERAPY (HRT): ICD-10-CM

## 2023-03-02 RX ORDER — UBROGEPANT 100 MG/1
100 TABLET ORAL SEE ADMIN INSTRUCTIONS
Qty: 30 TABLET | Refills: 1 | Status: SHIPPED | OUTPATIENT
Start: 2023-03-02

## 2023-03-02 RX ORDER — TOPIRAMATE 50 MG/1
50 TABLET, FILM COATED ORAL 2 TIMES DAILY
Qty: 60 TABLET | Refills: 5 | Status: SHIPPED | OUTPATIENT
Start: 2023-03-02

## 2023-03-02 RX ORDER — ESTRADIOL 0.05 MG/D
1 FILM, EXTENDED RELEASE TRANSDERMAL 2 TIMES WEEKLY
Qty: 24 PATCH | Refills: 1 | Status: SHIPPED | OUTPATIENT
Start: 2023-03-02

## 2023-03-02 NOTE — TELEPHONE ENCOUNTER
From: Susan Bustos  To: Vivilu Martinez  Sent: 2/28/2023 10:17 AM EST  Subject: Meds    [facility] estradiol patches, topomax and qulipta under [facility] they won’t send you all refill request ~ please send them in.    Also the quilipta is no saying not covered she told me I had to contact you.. so that Umbrelvy and neurtec denied… I take quilipta every day I need this one. I’ve also taken immetrex before but heart raced way too fast . Can you please see if they can see if it can be precerted? I don’t know what they want me to change to I’ve taken qulipta since it came out and it works… thank you~ sorry

## 2023-03-07 ENCOUNTER — PRIOR AUTHORIZATION (OUTPATIENT)
Dept: INTERNAL MEDICINE | Facility: CLINIC | Age: 48
End: 2023-03-07
Payer: COMMERCIAL

## 2023-03-14 NOTE — TELEPHONE ENCOUNTER
Attempted to contact patient. Left message to call the office back.       HUB OK TO READ/ADVISE:  Prior Auth for Ubrelvy was denied.   Per Dr. Lang - Could try something different like Nurtec.

## 2023-03-14 NOTE — TELEPHONE ENCOUNTER
Spoke with patient. Transferred from HUB.     Patient's PA was denied for Nurtec as well.     Patient has tried Imitrex, currently on Topamax.   Patient would like to avoid injectables.     Patient states she gets help from the Ubrelvy, Quilipta, and Topamax.

## 2023-03-14 NOTE — TELEPHONE ENCOUNTER
I have copay card for nico. Can we try sending nurtec to the specified pharmacy that seems to help cover costs as well.

## 2023-03-16 ENCOUNTER — PATIENT MESSAGE (OUTPATIENT)
Dept: INTERNAL MEDICINE | Facility: CLINIC | Age: 48
End: 2023-03-16
Payer: COMMERCIAL

## 2023-03-16 DIAGNOSIS — F41.9 ANXIETY DISORDER, UNSPECIFIED TYPE: ICD-10-CM

## 2023-03-20 ENCOUNTER — PATIENT MESSAGE (OUTPATIENT)
Dept: INTERNAL MEDICINE | Facility: CLINIC | Age: 48
End: 2023-03-20
Payer: COMMERCIAL

## 2023-03-21 NOTE — TELEPHONE ENCOUNTER
From: Susan uBstos  To: Vivi Martinez  Sent: 3/16/2023 2:09 PM EDT  Subject: Med    Sorry to bug u again I feel like such a pest~ I tried the klonipin and I just can’t do it especially at work. It’s due to be refilled and I’m not gonna do it. Can you or Dr Lang please do my 90 day supply of Ativan at [facility]. That works much better for me and I can function!! The 90 supply just saves lots of money. The the girl called the other day about working on my qulipta prior auth let me know where that’s at I do have our reps phone number she said she would be happy to help her!! Thank you so very much!! Have a great day

## 2023-03-22 RX ORDER — LORAZEPAM 1 MG/1
1 TABLET ORAL EVERY 8 HOURS PRN
Qty: 270 TABLET | Refills: 0 | Status: SHIPPED | OUTPATIENT
Start: 2023-03-22

## 2023-03-31 ENCOUNTER — TELEPHONE (OUTPATIENT)
Dept: INTERNAL MEDICINE | Facility: CLINIC | Age: 48
End: 2023-03-31
Payer: COMMERCIAL

## 2023-03-31 DIAGNOSIS — G43.909 MIGRAINE WITHOUT STATUS MIGRAINOSUS, NOT INTRACTABLE, UNSPECIFIED MIGRAINE TYPE: ICD-10-CM

## 2023-03-31 NOTE — TELEPHONE ENCOUNTER
This patient sent a Viverae message about this medication.   I seen that [facility] had sent in Qulipta before but other than that I dont see it on the active  medication list.   It does look like Ubrelvy was sent in but as she said it was denied.   I was wondering if there was anyway's we could send in that medication for her to try.

## 2023-03-31 NOTE — TELEPHONE ENCOUNTER
----- Message from Susan Bustos sent at 3/31/2023 12:52 PM EDT -----  Regarding: medication  Contact: [phone]  Ok so I may have an answer to get the prior auth approved for my qulipta.. what diagnosis code was used?? It needs to be profilactic  episodic migraines not chronic that’s what shaji says so please try that I’m having a horrible one today    I got the denial in the umbrelvy and u don’t get it I did try immetrex and failed neurtex samples cause they denied it so I don’t know what else to do but I’m afraid running out of this qulipta is going to be bad.. like I said the rep will help you I wish I could do it for you I’m sorry ~ Susan Bustos

## 2023-04-03 RX ORDER — ATOGEPANT 60 MG/1
60 TABLET ORAL DAILY
Qty: 30 TABLET | Refills: 11 | Status: SHIPPED | OUTPATIENT
Start: 2023-04-03

## 2023-04-07 ENCOUNTER — PRIOR AUTHORIZATION (OUTPATIENT)
Dept: INTERNAL MEDICINE | Facility: CLINIC | Age: 48
End: 2023-04-07
Payer: COMMERCIAL

## 2023-04-07 NOTE — TELEPHONE ENCOUNTER
PA REQUIRED FOR FOLLOWING MEDICATION:    Atogepant (Qulipta) 60 MG tablet (04/03/2023)    INDEXED VIA ONBASE

## 2023-04-10 DIAGNOSIS — M79.7 FIBROMYALGIA: ICD-10-CM

## 2023-04-10 DIAGNOSIS — G43.909 MIGRAINE WITHOUT STATUS MIGRAINOSUS, NOT INTRACTABLE, UNSPECIFIED MIGRAINE TYPE: ICD-10-CM

## 2023-04-10 NOTE — TELEPHONE ENCOUNTER
Approvedtoday  PA Case: [id number], Status: Approved, Coverage Starts on: 4/10/2023 12:00:00 AM, Coverage Ends on: 4/9/2024 12:00:00 AM.

## 2023-04-12 DIAGNOSIS — M79.7 FIBROMYALGIA: ICD-10-CM

## 2023-04-12 DIAGNOSIS — G43.909 MIGRAINE WITHOUT STATUS MIGRAINOSUS, NOT INTRACTABLE, UNSPECIFIED MIGRAINE TYPE: ICD-10-CM

## 2023-04-12 RX ORDER — TRAMADOL HYDROCHLORIDE 50 MG/1
50 TABLET ORAL EVERY 8 HOURS PRN
Qty: 270 TABLET | Refills: 1 | Status: SHIPPED | OUTPATIENT
Start: 2023-04-12

## 2023-04-12 NOTE — TELEPHONE ENCOUNTER
Rx Refill Note  Requested Prescriptions     Pending Prescriptions Disp Refills   • traMADol (ULTRAM) 50 MG tablet 270 tablet 1     Sig: Take 1 tablet by mouth Every 8 (Eight) Hours As Needed (prn).      Last office visit with prescribing clinician: 12/9/2022   Last telemedicine visit with prescribing clinician: 5/10/2023   Next office visit with prescribing clinician: Visit date not found                         Would you like a call back once the refill request has been completed: [] Yes [] No    If the office needs to give you a call back, can they leave a voicemail: [] Yes [] No    Gonzalez Adamson  04/12/23, 09:35 EDT   Refill request for  controlled substance.      Date of request: 4/12/2023    Medication requested: Tramadol  Last OV: 2/8/2023  Last UDS:  12/09/2022  Contract signed: yes    Date:12/09/2022  Next office visit: 5/10/2023  Gonzalez Adamson

## 2023-04-12 NOTE — TELEPHONE ENCOUNTER
----- Message from Susan Bustos sent at 4/11/2023 12:37 PM EDT -----  Regarding: medication  Contact: [phone]  They have got my qulipta ordered thank you now they are trying to send dr perez a refill request for my tramadol it’s still under [facility] can you please have someone send the refill for it.. I’m sorry I really thought all my meds were converted now… thank you hopefully this is it

## 2023-04-18 RX ORDER — TRAMADOL HYDROCHLORIDE 50 MG/1
TABLET ORAL
Qty: 270 TABLET | OUTPATIENT
Start: 2023-04-18

## 2023-05-10 ENCOUNTER — TELEMEDICINE (OUTPATIENT)
Dept: INTERNAL MEDICINE | Facility: CLINIC | Age: 48
End: 2023-05-10
Payer: COMMERCIAL

## 2023-05-10 DIAGNOSIS — G43.909 MIGRAINE WITHOUT STATUS MIGRAINOSUS, NOT INTRACTABLE, UNSPECIFIED MIGRAINE TYPE: Chronic | ICD-10-CM

## 2023-05-10 DIAGNOSIS — F41.9 ANXIETY DISORDER, UNSPECIFIED TYPE: Primary | Chronic | ICD-10-CM

## 2023-05-10 PROCEDURE — 99214 OFFICE O/P EST MOD 30 MIN: CPT | Performed by: NURSE PRACTITIONER

## 2023-05-10 NOTE — PROGRESS NOTES
Chief Complaint    Migraines / Anxiety 3 m f/u    Subjective          Susan Bustos presents to [facility]  History of Present Illness    Anxiety  Presents for follow-up visit. Symptoms include nervous/anxious behavior. Patient reports no chest pain, compulsions, confusion, decreased concentration, depressed mood, dizziness, dry mouth, excessive worry, feeling of choking, hyperventilation, impotence, insomnia, irritability, malaise, muscle tension, nausea, obsessions, palpitations, panic, restlessness, shortness of breath or suicidal ideas. The severity of symptoms is mild.     Compliance with medications is %. Dr. KINCAID tried to switch her to klonopin, states that she was drowsy at work and unable to take. Switched back to ativan.     Migraine  Headache pattern:  Headache sometimes there, sometimes not at all  Patient reports that Ubrelvy is very helpful with her headaches but having trouble with getting insurance to cover.   1st PA was denied.     Current Outpatient Medications   Medication Instructions   • albuterol sulfate  (90 Base) MCG/ACT inhaler 2 puffs, Inhalation, Every 4 Hours PRN   • aspirin 81 mg, Oral, Daily   • estradiol (MINIVELLE, VIVELLE-DOT) 0.05 MG/24HR patch 1 patch, Transdermal, 2 Times Weekly   • hyoscyamine (LEVSIN) 0.125 mg, Oral, Every 4 Hours PRN   • levothyroxine (SYNTHROID) 25 mcg, Oral, Daily   • LORazepam (ATIVAN) 1 mg, Oral, Every 8 Hours PRN, for anxiety   • Qulipta 60 mg, Oral, Daily   • topiramate (TOPAMAX) 50 mg, Oral, 2 Times Daily   • traMADol (ULTRAM) 50 mg, Oral, Every 8 Hours PRN   • Ubrelvy 100 mg, Oral, See Admin Instructions   • vitamin D (ERGOCALCIFEROL) 50,000 Units, Oral, Weekly   • zolpidem (AMBIEN) 10 mg, Oral, Every Night at Bedtime       The following portions of the patient's history were reviewed and updated as appropriate: allergies, current medications, past family history, past medical history, past  social history, past surgical history, and problem list.    Objective   Vital Signs:   There were no vitals taken for this visit.    Wt Readings from Last 3 Encounters:   02/08/23 51.7 kg (114 lb)   12/09/22 53.9 kg (118 lb 12.8 oz)   09/12/22 60.3 kg (133 lb)     BP Readings from Last 3 Encounters:   02/08/23 128/89   12/09/22 142/90   09/12/22 132/89     Physical Exam   Appearance: No acute distress, well-nourished  Head: normocephalic, atraumatic  Eyes: extraocular movements intact, no scleral icterus, no conjunctival injection  Ears, Nose, and Throat: external ears normal, nares patent, moist mucous membranes  Cardiovascular: regular rate and rhythm. no murmurs, rubs, or gallops. no edema  Respiratory: breathing comfortably, symmetric chest rise, clear to auscultation bilaterally. No wheezes, rales, or rhonchi.  Neuro: alert and oriented to time, place, and person. Normal gait  Psych: normal mood and affect     Result Review :   The following data was reviewed by: ROZINA Garcia on 05/10/2023:  Common labs        12/9/2022    10:23   Common Labs   Glucose 99     BUN 8     Creatinine 1.11     Sodium 140     Potassium 3.8     Chloride 104     Calcium 9.1     Albumin 4.50     Total Bilirubin 0.3     Alkaline Phosphatase 90     AST (SGOT) 13     ALT (SGPT) 7     WBC 5.47     Hemoglobin 15.2     Hematocrit 44.9     Platelets 252     Total Cholesterol 228     Triglycerides 91     HDL Cholesterol 68     LDL Cholesterol  144     Hemoglobin A1C 5.20              Lab Results   Component Value Date    BILIRUBINUR Negative 04/05/2022       Procedures        Assessment and Plan    Diagnoses and all orders for this visit:    1. Anxiety disorder, unspecified type (Primary)  Comments:  well controlled on current regimen.     2. Migraine without status migrainosus, not intractable, unspecified migraine type  Comments:  antoinette gtz complete 2nd PA appeal for ubrelvy - asked her to reach out to patient when she hears  chloe MESSINA reviewed.   UDS UTD   Controlled consent UTD     Medications Discontinued During This Encounter   Medication Reason   • Rimegepant Sulfate (NURTEC) 75 MG tablet dispersible tablet    • clonazePAM (KlonoPIN) 1 MG tablet           Follow Up   Return in about 3 months (around 8/10/2023) for Follow-up with Christ Huffman.  Patient was given instructions and counseling regarding her condition or for health maintenance advice. Please see specific information pulled into the AVS if appropriate.       Vivi Martinez, APRN  05/17/23  08:05 EDT             ubrelvy - cardiovasvular risk

## 2023-05-15 ENCOUNTER — TELEPHONE (OUTPATIENT)
Dept: INTERNAL MEDICINE | Facility: CLINIC | Age: 48
End: 2023-05-15
Payer: COMMERCIAL

## 2023-05-15 NOTE — TELEPHONE ENCOUNTER
PA FOR UBRELVY 100 MG SENT TO PLAN TODAY 05/15/2023.    AWAITING RESPONSE FROM INSURANCE     Key: [id number]   PA Case ID: [id number]

## 2023-05-16 NOTE — TELEPHONE ENCOUNTER
We are able to get nurtec covered well I believe. Would she be amenable to usuing nurtec? IT is same class of medication as ubrelvy.

## 2023-05-17 ENCOUNTER — TELEPHONE (OUTPATIENT)
Dept: INTERNAL MEDICINE | Facility: CLINIC | Age: 48
End: 2023-05-17
Payer: COMMERCIAL

## 2023-05-17 NOTE — TELEPHONE ENCOUNTER
Attempted to contact patient. Left message to call the office back.     HUB OK TO READ/ADVISE:    Patient needs scheduled for a 3 month follow-up.  HUB please schedule patient.

## 2023-05-17 NOTE — TELEPHONE ENCOUNTER
HUB READ FOLLOWING MESSAGE :        HUB OK TO READ/ADVISE:     Patient needs scheduled for a 3 month follow-up.  HUB please schedule patient.       PATIENT IS SCHEDULED FOR 8.1.2023.

## 2023-05-17 NOTE — TELEPHONE ENCOUNTER
Attempted to contact patient. Left message to call the office back.    HUB OK TO READ/ADVISE:  We have redone the PA for Ubrelvy and it was denied.   Per Dr. Lang - We are able to get nurtec covered well I believe. Would she be amenable to usuing nurtec? It is same class of medication as ubrelvy.

## 2023-05-19 NOTE — TELEPHONE ENCOUNTER
2nd attempt to contact patient. Left message to call the office back.    HUB OK TO READ/ADVISE:  We have redone the PA for Ubrelvy and it was denied.   Per Dr. Lang - We are able to get nurtec covered well I believe. Would she be amenable to usuing nurtec? It is same class of medication as ubrelvy.

## 2023-05-19 NOTE — TELEPHONE ENCOUNTER
Patient called back   She stated that it was suppose to put on the PA   She failed the nurtec samples- they didn't work       Family history cardiovasvular risk- father, brother, Mother

## 2023-06-07 ENCOUNTER — HOSPITAL ENCOUNTER (OUTPATIENT)
Dept: MAMMOGRAPHY | Facility: HOSPITAL | Age: 48
Discharge: HOME | End: 2023-06-07
Admitting: NURSE PRACTITIONER
Payer: COMMERCIAL

## 2023-06-07 DIAGNOSIS — Z12.72 ENCOUNTER FOR FOLLOW-UP VAGINAL PAPANICOLAOU SMEAR: ICD-10-CM

## 2023-06-07 PROCEDURE — 77067 SCR MAMMO BI INCL CAD: CPT

## 2023-06-07 PROCEDURE — 77063 BREAST TOMOSYNTHESIS BI: CPT

## 2023-06-18 DIAGNOSIS — F41.9 ANXIETY DISORDER, UNSPECIFIED TYPE: ICD-10-CM

## 2023-06-19 RX ORDER — LORAZEPAM 1 MG/1
1 TABLET ORAL EVERY 8 HOURS PRN
Qty: 90 TABLET | Refills: 0 | Status: SHIPPED | OUTPATIENT
Start: 2023-06-19

## 2023-08-01 ENCOUNTER — OFFICE VISIT (OUTPATIENT)
Dept: INTERNAL MEDICINE | Facility: CLINIC | Age: 48
End: 2023-08-01
Payer: COMMERCIAL

## 2023-08-01 VITALS
WEIGHT: 109 LBS | HEART RATE: 89 BPM | OXYGEN SATURATION: 98 % | HEIGHT: 61 IN | SYSTOLIC BLOOD PRESSURE: 137 MMHG | DIASTOLIC BLOOD PRESSURE: 88 MMHG | TEMPERATURE: 97.1 F | BODY MASS INDEX: 20.58 KG/M2

## 2023-08-01 DIAGNOSIS — E03.9 ACQUIRED HYPOTHYROIDISM: ICD-10-CM

## 2023-08-01 DIAGNOSIS — Z00.00 ANNUAL PHYSICAL EXAM: Primary | ICD-10-CM

## 2023-08-01 DIAGNOSIS — E55.9 VITAMIN D DEFICIENCY: ICD-10-CM

## 2023-08-01 DIAGNOSIS — E78.2 MIXED HYPERLIPIDEMIA: ICD-10-CM

## 2023-08-01 DIAGNOSIS — R79.89 ABNORMAL BLOOD CREATININE LEVEL: ICD-10-CM

## 2023-08-01 DIAGNOSIS — M81.0 OSTEOPOROSIS WITHOUT CURRENT PATHOLOGICAL FRACTURE, UNSPECIFIED OSTEOPOROSIS TYPE: ICD-10-CM

## 2023-08-01 LAB
25(OH)D3 SERPL-MCNC: 27.8 NG/ML (ref 30–100)
ALBUMIN SERPL-MCNC: 4.4 G/DL (ref 3.5–5.2)
ALBUMIN/GLOB SERPL: 1.6 G/DL
ALP SERPL-CCNC: 100 U/L (ref 39–117)
ALT SERPL W P-5'-P-CCNC: 9 U/L (ref 1–33)
ANION GAP SERPL CALCULATED.3IONS-SCNC: 11.6 MMOL/L (ref 5–15)
AST SERPL-CCNC: 14 U/L (ref 1–32)
BASOPHILS # BLD AUTO: 0.05 10*3/MM3 (ref 0–0.2)
BASOPHILS NFR BLD AUTO: 1 % (ref 0–1.5)
BILIRUB SERPL-MCNC: 0.4 MG/DL (ref 0–1.2)
BUN SERPL-MCNC: 12 MG/DL (ref 6–20)
BUN/CREAT SERPL: 10.3 (ref 7–25)
CALCIUM SPEC-SCNC: 9.7 MG/DL (ref 8.6–10.5)
CHLORIDE SERPL-SCNC: 106 MMOL/L (ref 98–107)
CHOLEST SERPL-MCNC: 250 MG/DL (ref 0–200)
CO2 SERPL-SCNC: 24.4 MMOL/L (ref 22–29)
CREAT SERPL-MCNC: 1.16 MG/DL (ref 0.57–1)
DEPRECATED RDW RBC AUTO: 43.2 FL (ref 37–54)
EGFRCR SERPLBLD CKD-EPI 2021: 57.6 ML/MIN/1.73
EOSINOPHIL # BLD AUTO: 0.25 10*3/MM3 (ref 0–0.4)
EOSINOPHIL NFR BLD AUTO: 4.8 % (ref 0.3–6.2)
ERYTHROCYTE [DISTWIDTH] IN BLOOD BY AUTOMATED COUNT: 11.9 % (ref 12.3–15.4)
GLOBULIN UR ELPH-MCNC: 2.7 GM/DL
GLUCOSE SERPL-MCNC: 79 MG/DL (ref 65–99)
HCT VFR BLD AUTO: 47 % (ref 34–46.6)
HDLC SERPL-MCNC: 84 MG/DL (ref 40–60)
HGB BLD-MCNC: 15.9 G/DL (ref 12–15.9)
IMM GRANULOCYTES # BLD AUTO: 0.01 10*3/MM3 (ref 0–0.05)
IMM GRANULOCYTES NFR BLD AUTO: 0.2 % (ref 0–0.5)
LDLC SERPL CALC-MCNC: 151 MG/DL (ref 0–100)
LDLC/HDLC SERPL: 1.76 {RATIO}
LYMPHOCYTES # BLD AUTO: 1.37 10*3/MM3 (ref 0.7–3.1)
LYMPHOCYTES NFR BLD AUTO: 26.1 % (ref 19.6–45.3)
MCH RBC QN AUTO: 32.9 PG (ref 26.6–33)
MCHC RBC AUTO-ENTMCNC: 33.8 G/DL (ref 31.5–35.7)
MCV RBC AUTO: 97.1 FL (ref 79–97)
MONOCYTES # BLD AUTO: 0.44 10*3/MM3 (ref 0.1–0.9)
MONOCYTES NFR BLD AUTO: 8.4 % (ref 5–12)
NEUTROPHILS NFR BLD AUTO: 3.13 10*3/MM3 (ref 1.7–7)
NEUTROPHILS NFR BLD AUTO: 59.5 % (ref 42.7–76)
NRBC BLD AUTO-RTO: 0 /100 WBC (ref 0–0.2)
PLATELET # BLD AUTO: 280 10*3/MM3 (ref 140–450)
PMV BLD AUTO: 9.7 FL (ref 6–12)
POTASSIUM SERPL-SCNC: 4.6 MMOL/L (ref 3.5–5.2)
PROT SERPL-MCNC: 7.1 G/DL (ref 6–8.5)
RBC # BLD AUTO: 4.84 10*6/MM3 (ref 3.77–5.28)
SODIUM SERPL-SCNC: 142 MMOL/L (ref 136–145)
TRIGL SERPL-MCNC: 91 MG/DL (ref 0–150)
TSH SERPL DL<=0.05 MIU/L-ACNC: 3.19 UIU/ML (ref 0.27–4.2)
VLDLC SERPL-MCNC: 15 MG/DL (ref 5–40)
WBC NRBC COR # BLD: 5.25 10*3/MM3 (ref 3.4–10.8)

## 2023-08-01 PROCEDURE — 82306 VITAMIN D 25 HYDROXY: CPT | Performed by: INTERNAL MEDICINE

## 2023-08-01 PROCEDURE — 80061 LIPID PANEL: CPT | Performed by: INTERNAL MEDICINE

## 2023-08-01 PROCEDURE — 80050 GENERAL HEALTH PANEL: CPT | Performed by: INTERNAL MEDICINE

## 2023-08-01 PROCEDURE — 99396 PREV VISIT EST AGE 40-64: CPT | Performed by: INTERNAL MEDICINE

## 2023-08-02 ENCOUNTER — TELEPHONE (OUTPATIENT)
Dept: INTERNAL MEDICINE | Facility: CLINIC | Age: 48
End: 2023-08-02
Payer: COMMERCIAL

## 2023-08-03 DIAGNOSIS — G47.00 INSOMNIA, UNSPECIFIED TYPE: ICD-10-CM

## 2023-08-04 RX ORDER — ZOLPIDEM TARTRATE 10 MG/1
10 TABLET ORAL
Qty: 90 TABLET | Refills: 1 | Status: SHIPPED | OUTPATIENT
Start: 2023-08-04

## 2023-08-09 DIAGNOSIS — E55.9 VITAMIN D DEFICIENCY: Chronic | ICD-10-CM

## 2023-08-09 DIAGNOSIS — E03.9 HYPOTHYROIDISM, UNSPECIFIED TYPE: Chronic | ICD-10-CM

## 2023-08-09 RX ORDER — ERGOCALCIFEROL 1.25 MG/1
50000 CAPSULE ORAL WEEKLY
Qty: 13 CAPSULE | Refills: 1 | Status: SHIPPED | OUTPATIENT
Start: 2023-08-09

## 2023-08-09 RX ORDER — LEVOTHYROXINE SODIUM 0.03 MG/1
25 TABLET ORAL DAILY
Qty: 90 TABLET | Refills: 3 | Status: SHIPPED | OUTPATIENT
Start: 2023-08-09

## 2023-08-22 DIAGNOSIS — F41.9 ANXIETY DISORDER, UNSPECIFIED TYPE: ICD-10-CM

## 2023-08-22 RX ORDER — LORAZEPAM 1 MG/1
TABLET ORAL
Qty: 90 TABLET | Refills: 0 | Status: SHIPPED | OUTPATIENT
Start: 2023-08-22

## 2023-08-22 NOTE — TELEPHONE ENCOUNTER
CONTROLLED MEDICATION. PLEASE ADVISE.     Lorazepam 1mg    Last Filled: 7/21/23 #90-no refills   Last Visit: 8/1/23  Next Appt: 1/5/24  Last UDS: 12/9/22  Last Controlled Contract: 12/9/22

## 2023-09-08 DIAGNOSIS — M25.551 RIGHT HIP PAIN: Primary | ICD-10-CM

## 2023-09-08 DIAGNOSIS — M54.50 LUMBAR PAIN: ICD-10-CM

## 2023-09-11 ENCOUNTER — HOSPITAL ENCOUNTER (OUTPATIENT)
Dept: GENERAL RADIOLOGY | Facility: HOSPITAL | Age: 48
Discharge: HOME | End: 2023-09-11
Payer: COMMERCIAL

## 2023-09-11 DIAGNOSIS — M54.50 LUMBAR PAIN: ICD-10-CM

## 2023-09-11 DIAGNOSIS — M25.551 RIGHT HIP PAIN: ICD-10-CM

## 2023-09-11 PROCEDURE — 72100 X-RAY EXAM L-S SPINE 2/3 VWS: CPT

## 2023-09-11 PROCEDURE — 73502 X-RAY EXAM HIP UNI 2-3 VIEWS: CPT

## 2023-09-30 DIAGNOSIS — F41.9 ANXIETY DISORDER, UNSPECIFIED TYPE: ICD-10-CM

## 2023-10-03 RX ORDER — LORAZEPAM 1 MG/1
1 TABLET ORAL EVERY 8 HOURS PRN
Qty: 90 TABLET | Refills: 0 | Status: SHIPPED | OUTPATIENT
Start: 2023-10-03

## 2023-11-06 ENCOUNTER — HOSPITAL ENCOUNTER (OUTPATIENT)
Dept: BONE DENSITY | Facility: HOSPITAL | Age: 48
Discharge: HOME | End: 2023-11-06
Admitting: INTERNAL MEDICINE
Payer: COMMERCIAL

## 2023-11-06 DIAGNOSIS — M81.0 OSTEOPOROSIS WITHOUT CURRENT PATHOLOGICAL FRACTURE, UNSPECIFIED OSTEOPOROSIS TYPE: ICD-10-CM

## 2023-11-06 PROCEDURE — 77080 DXA BONE DENSITY AXIAL: CPT

## 2023-11-07 ENCOUNTER — PATIENT MESSAGE (OUTPATIENT)
Dept: INTERNAL MEDICINE | Facility: CLINIC | Age: 48
End: 2023-11-07
Payer: COMMERCIAL

## 2023-11-07 DIAGNOSIS — M81.0 AGE-RELATED OSTEOPOROSIS WITHOUT CURRENT PATHOLOGICAL FRACTURE: Primary | ICD-10-CM

## 2023-11-07 RX ORDER — ALENDRONATE SODIUM 70 MG/1
70 TABLET ORAL
Qty: 13 TABLET | Refills: 3 | Status: SHIPPED | OUTPATIENT
Start: 2023-11-07

## 2023-11-07 NOTE — TELEPHONE ENCOUNTER
From: Susan Bustos  To: Flaco Lang  Sent: 11/7/2023 8:04 AM EST  Subject: Dexa     I would rather do the injection then fosamax please. [facility] had me set up for it before and I got scared but obviously it’s gotten a lot worse in 2 years ~ I just don’t have the time and patience to do the fosamax ( it scares me too)   Let me know if they can get it preferred and batsheva preferably for a Wednesday ~ thank you!  Have a great day

## 2023-11-13 DIAGNOSIS — G47.00 INSOMNIA, UNSPECIFIED TYPE: ICD-10-CM

## 2023-11-13 DIAGNOSIS — G43.909 MIGRAINE WITHOUT STATUS MIGRAINOSUS, NOT INTRACTABLE, UNSPECIFIED MIGRAINE TYPE: ICD-10-CM

## 2023-11-13 DIAGNOSIS — F41.9 ANXIETY DISORDER, UNSPECIFIED TYPE: ICD-10-CM

## 2023-11-13 DIAGNOSIS — M79.7 FIBROMYALGIA: ICD-10-CM

## 2023-11-14 DIAGNOSIS — G47.00 INSOMNIA, UNSPECIFIED TYPE: ICD-10-CM

## 2023-11-14 DIAGNOSIS — Z79.890 HORMONE REPLACEMENT THERAPY (HRT): ICD-10-CM

## 2023-11-14 RX ORDER — ZOLPIDEM TARTRATE 10 MG/1
10 TABLET ORAL
Qty: 90 TABLET | Refills: 1 | Status: SHIPPED | OUTPATIENT
Start: 2023-11-14

## 2023-11-14 RX ORDER — ESTRADIOL 0.05 MG/D
1 PATCH, EXTENDED RELEASE TRANSDERMAL 2 TIMES WEEKLY
Qty: 24 PATCH | Refills: 2 | Status: SHIPPED | OUTPATIENT
Start: 2023-11-16

## 2023-11-14 RX ORDER — ZOLPIDEM TARTRATE 10 MG/1
10 TABLET ORAL
Qty: 90 TABLET | Refills: 1 | OUTPATIENT
Start: 2023-11-14

## 2023-11-14 RX ORDER — TRAMADOL HYDROCHLORIDE 50 MG/1
50 TABLET ORAL EVERY 8 HOURS PRN
Qty: 270 TABLET | Refills: 0 | Status: SHIPPED | OUTPATIENT
Start: 2023-11-14

## 2023-11-14 RX ORDER — LORAZEPAM 1 MG/1
1 TABLET ORAL EVERY 8 HOURS PRN
Qty: 90 TABLET | Refills: 0 | Status: SHIPPED | OUTPATIENT
Start: 2023-11-14

## 2023-11-14 NOTE — TELEPHONE ENCOUNTER
Refill request for  controlled substance.      Date of request: 11/14/2023   Medication requested: Tramadol and Ativan  (Lorazepam)  Last OV: 8/1/23  Last UDS: 12/9/22  Contract signed: yes    Date:12/9/22  Next office visit: 1/5/24    Yulia Talley

## 2023-11-22 DIAGNOSIS — G43.909 MIGRAINE WITHOUT STATUS MIGRAINOSUS, NOT INTRACTABLE, UNSPECIFIED MIGRAINE TYPE: ICD-10-CM

## 2023-11-22 RX ORDER — TOPIRAMATE 50 MG/1
50 TABLET, FILM COATED ORAL 2 TIMES DAILY
Qty: 180 TABLET | Refills: 1 | Status: SHIPPED | OUTPATIENT
Start: 2023-11-22

## 2023-11-29 ENCOUNTER — HOSPITAL ENCOUNTER (OUTPATIENT)
Dept: INFUSION THERAPY | Facility: HOSPITAL | Age: 48
Discharge: HOME | End: 2023-11-29
Admitting: INTERNAL MEDICINE
Payer: COMMERCIAL

## 2023-11-29 VITALS
TEMPERATURE: 97.7 F | DIASTOLIC BLOOD PRESSURE: 77 MMHG | WEIGHT: 114.64 LBS | RESPIRATION RATE: 18 BRPM | SYSTOLIC BLOOD PRESSURE: 132 MMHG | BODY MASS INDEX: 22.51 KG/M2 | HEIGHT: 60 IN | HEART RATE: 86 BPM | OXYGEN SATURATION: 98 %

## 2023-11-29 DIAGNOSIS — M81.0 AGE-RELATED OSTEOPOROSIS WITHOUT CURRENT PATHOLOGICAL FRACTURE: Primary | ICD-10-CM

## 2023-11-29 LAB
ALBUMIN SERPL-MCNC: 4.2 G/DL (ref 3.5–5.2)
ALBUMIN/GLOB SERPL: 1.7 G/DL
ALP SERPL-CCNC: 96 U/L (ref 39–117)
ALT SERPL W P-5'-P-CCNC: 7 U/L (ref 1–33)
ANION GAP SERPL CALCULATED.3IONS-SCNC: 8.7 MMOL/L (ref 5–15)
AST SERPL-CCNC: 11 U/L (ref 1–32)
BILIRUB SERPL-MCNC: 0.3 MG/DL (ref 0–1.2)
BUN SERPL-MCNC: 9 MG/DL (ref 6–20)
BUN/CREAT SERPL: 8.4 (ref 7–25)
CALCIUM SPEC-SCNC: 8.8 MG/DL (ref 8.6–10.5)
CHLORIDE SERPL-SCNC: 108 MMOL/L (ref 98–107)
CO2 SERPL-SCNC: 25.3 MMOL/L (ref 22–29)
CREAT SERPL-MCNC: 1.07 MG/DL (ref 0.57–1)
EGFRCR SERPLBLD CKD-EPI 2021: 63.4 ML/MIN/1.73
GLOBULIN UR ELPH-MCNC: 2.5 GM/DL
GLUCOSE SERPL-MCNC: 114 MG/DL (ref 65–99)
POTASSIUM SERPL-SCNC: 4.1 MMOL/L (ref 3.5–5.2)
PROT SERPL-MCNC: 6.7 G/DL (ref 6–8.5)
SODIUM SERPL-SCNC: 142 MMOL/L (ref 136–145)

## 2023-11-29 PROCEDURE — 25010000002 DENOSUMAB 60 MG/ML SOLUTION PREFILLED SYRINGE: Performed by: INTERNAL MEDICINE

## 2023-11-29 PROCEDURE — 96372 THER/PROPH/DIAG INJ SC/IM: CPT

## 2023-11-29 PROCEDURE — 80053 COMPREHEN METABOLIC PANEL: CPT | Performed by: INTERNAL MEDICINE

## 2023-11-29 RX ADMIN — DENOSUMAB 60 MG: 60 INJECTION SUBCUTANEOUS at 14:43

## 2023-12-18 DIAGNOSIS — F41.9 ANXIETY DISORDER, UNSPECIFIED TYPE: ICD-10-CM

## 2023-12-19 RX ORDER — LORAZEPAM 1 MG/1
1 TABLET ORAL EVERY 8 HOURS PRN
Qty: 90 TABLET | Refills: 0 | Status: SHIPPED | OUTPATIENT
Start: 2023-12-19

## 2023-12-19 NOTE — TELEPHONE ENCOUNTER
Rx Refill Note  Requested Prescriptions     Pending Prescriptions Disp Refills    LORazepam (ATIVAN) 1 MG tablet 90 tablet 0     Sig: Take 1 tablet by mouth Every 8 (Eight) Hours As Needed for Anxiety. for anxiety      Last office visit with prescribing clinician: 8/1/2023   Last telemedicine visit with prescribing clinician: Visit date not found   Next office visit with prescribing clinician: 1/5/2024                         Would you like a call back once the refill request has been completed: [] Yes [] No    If the office needs to give you a call back, can they leave a voicemail: [] Yes [] No    Gonzalez Adamson  12/19/23, 08:20 EST    Patient comment: Can we please do a 90 supply so i dont have to bother  you every 30 days? Have a devi Graff!! See you next month!!

## 2024-01-05 ENCOUNTER — OFFICE VISIT (OUTPATIENT)
Dept: INTERNAL MEDICINE | Facility: CLINIC | Age: 49
End: 2024-01-05
Payer: COMMERCIAL

## 2024-01-05 VITALS
TEMPERATURE: 98.2 F | SYSTOLIC BLOOD PRESSURE: 133 MMHG | BODY MASS INDEX: 22.42 KG/M2 | HEART RATE: 90 BPM | OXYGEN SATURATION: 98 % | HEIGHT: 60 IN | DIASTOLIC BLOOD PRESSURE: 89 MMHG | WEIGHT: 114.2 LBS

## 2024-01-05 DIAGNOSIS — I10 PRIMARY HYPERTENSION: Primary | ICD-10-CM

## 2024-01-05 DIAGNOSIS — E03.9 ACQUIRED HYPOTHYROIDISM: ICD-10-CM

## 2024-01-05 DIAGNOSIS — E78.2 MIXED HYPERLIPIDEMIA: ICD-10-CM

## 2024-01-05 DIAGNOSIS — E55.9 VITAMIN D DEFICIENCY: ICD-10-CM

## 2024-01-05 PROCEDURE — 99214 OFFICE O/P EST MOD 30 MIN: CPT | Performed by: INTERNAL MEDICINE

## 2024-01-05 RX ORDER — HYDROCHLOROTHIAZIDE 25 MG/1
25 TABLET ORAL DAILY
Qty: 90 TABLET | Refills: 1 | Status: SHIPPED | OUTPATIENT
Start: 2024-01-05

## 2024-01-05 RX ORDER — MULTIPLE VITAMINS W/ MINERALS TAB 9MG-400MCG
1 TAB ORAL DAILY
COMMUNITY

## 2024-01-05 NOTE — PROGRESS NOTES
"Chief Complaint  Hypothyroidism (FOLLOW UP ), MEDICATION  QUESTION  (WANTS TO TRY PROLIA BUT WANTS TO GET IT MAILED HERE AND GIVING IT HERE. /), and Laboring (JUST WANTED TO TALK ABOUT CREATINE LEVELS )      Subjective          Susan Bustos presents to [facility]  History of Present Illness  Hypothyroid- patient doing well. Weight largely unchanged.   Elevated Blood Pressure- patient has noticed both at work and at home. Patient reports rarely <130/80.   Anxiety- patient reports ativan works to keep things calm. She takes it BID.   Osteoporosis- patient reports having significant pain after prolia shot. Patient is doing well now.     Current Outpatient Medications   Medication Instructions    albuterol sulfate  (90 Base) MCG/ACT inhaler 2 puffs, Inhalation, Every 4 Hours PRN    aspirin 81 mg, Oral, Daily    estradiol (MINIVELLE, VIVELLE-DOT) 0.05 MG/24HR patch 1 patch, Transdermal, 2 Times Weekly    hydroCHLOROthiazide (HYDRODIURIL) 25 mg, Oral, Daily    hyoscyamine (LEVSIN) 0.125 mg, Oral, Every 4 Hours PRN    levothyroxine (SYNTHROID) 25 mcg, Oral, Daily    LORazepam (ATIVAN) 1 mg, Oral, Every 8 Hours PRN, for anxiety    multivitamin with minerals (MULTIVITAMIN ADULTS 50+ PO) 1 tablet, Oral, Daily    Qulipta 60 mg, Oral, Daily    topiramate (TOPAMAX) 50 mg, Oral, 2 Times Daily    traMADol (ULTRAM) 50 mg, Oral, Every 8 Hours PRN    Ubrelvy 100 mg, Oral, See Admin Instructions    vitamin D (ERGOCALCIFEROL) 50,000 Units, Oral, Weekly    zolpidem (AMBIEN) 10 mg, Oral, Every Night at Bedtime       The following portions of the patient's history were reviewed and updated as appropriate: allergies, current medications, past family history, past medical history, past social history, past surgical history, and problem list.    Objective   Vital Signs:   /89 (BP Location: Left arm)   Pulse 90   Temp 98.2 °F (36.8 °C) (Temporal)   Ht 152.4 cm (60\")   Wt " 51.8 kg (114 lb 3.2 oz)   SpO2 98%   BMI 22.30 kg/m²     BP Readings from Last 3 Encounters:   01/05/24 133/89   11/29/23 132/77   08/01/23 137/88     Wt Readings from Last 3 Encounters:   01/05/24 51.8 kg (114 lb 3.2 oz)   11/29/23 52 kg (114 lb 10.2 oz)   08/01/23 49.4 kg (109 lb)     BMI is within normal parameters. No other follow-up for BMI required.    Physical Exam   Appearance: No acute distress, well-nourished  Head: normocephalic, atraumatic  Eyes: extraocular movements intact, no scleral icterus, no conjunctival injection  Ears, Nose, and Throat: external ears normal, nares patent, moist mucous membranes  Cardiovascular: regular rate and rhythm. no murmurs, rubs, or gallops. no edema  Respiratory: breathing comfortably, symmetric chest rise, clear to auscultation bilaterally. No wheezes, rales, or rhonchi.  Neuro: alert and oriented to time, place, and person. Normal gait  Psych: normal mood and affect       Result Review :   The following data was reviewed by: Flaco Lang Jr, MD on 01/05/2024:  Common labs          8/1/2023    11:39 11/29/2023    14:11   Common Labs   Glucose 79  114    BUN 12  9    Creatinine 1.16  1.07    Sodium 142  142    Potassium 4.6  4.1    Chloride 106  108    Calcium 9.7  8.8    Albumin 4.4  4.2    Total Bilirubin 0.4  0.3    Alkaline Phosphatase 100  96    AST (SGOT) 14  11    ALT (SGPT) 9  7    WBC 5.25     Hemoglobin 15.9     Hematocrit 47.0     Platelets 280     Total Cholesterol 250     Triglycerides 91     HDL Cholesterol 84     LDL Cholesterol  151         Lab Results   Component Value Date    BILIRUBINUR Negative 04/05/2022        Assessment and Plan    Diagnoses and all orders for this visit:    1. Primary hypertension (Primary)  Comments:  will start HCTZ after discussion of risk sand benefits. monitor BP. goal <130/80  Orders:  -     hydroCHLOROthiazide (HYDRODIURIL) 25 MG tablet; Take 1 tablet by mouth Daily.  Dispense: 90 tablet; Refill: 1  -     CBC &  Differential; Future  -     Comprehensive Metabolic Panel; Future    2. Vitamin D deficiency  -     Vitamin D,25-Hydroxy; Future    3. Mixed hyperlipidemia  Comments:  cont asa. check labs.  Orders:  -     Lipid Panel; Future    4. Acquired hypothyroidism  Comments:  check TSH and adjust accordingly  Orders:  -     TSH; Future    There are no discontinued medications.       Follow Up   Return in about 6 months (around 7/5/2024) for Recheck, HTN.  Patient was given instructions and counseling regarding her condition or for health maintenance advice. Please see specific information pulled into the AVS if appropriate.       Flaco Lang Jr, MD  01/05/24  13:29 EST

## 2024-01-17 DIAGNOSIS — F41.9 ANXIETY DISORDER, UNSPECIFIED TYPE: ICD-10-CM

## 2024-01-17 NOTE — TELEPHONE ENCOUNTER
Refill request for  controlled substance.      Date of request: 1/17/2024    Medication requested: Ativan  (Lorazepam)  Last OV: 01/05/2024  Last UDS: 12/09/2022  Contract signed: yes    Date:12/09/2022  Next office visit: 07/26/2024    Kalyn Lowe MA

## 2024-01-18 RX ORDER — LORAZEPAM 1 MG/1
1 TABLET ORAL EVERY 8 HOURS PRN
Qty: 90 TABLET | Refills: 0 | Status: SHIPPED | OUTPATIENT
Start: 2024-01-18

## 2024-01-31 DIAGNOSIS — E03.9 HYPOTHYROIDISM, UNSPECIFIED TYPE: Chronic | ICD-10-CM

## 2024-01-31 RX ORDER — LEVOTHYROXINE SODIUM 0.03 MG/1
25 TABLET ORAL DAILY
Qty: 90 TABLET | Refills: 3 | OUTPATIENT
Start: 2024-01-31

## 2024-02-02 ENCOUNTER — PATIENT MESSAGE (OUTPATIENT)
Dept: INTERNAL MEDICINE | Facility: CLINIC | Age: 49
End: 2024-02-02
Payer: COMMERCIAL

## 2024-02-02 DIAGNOSIS — E55.9 VITAMIN D DEFICIENCY: Chronic | ICD-10-CM

## 2024-02-02 RX ORDER — ERGOCALCIFEROL 1.25 MG/1
50000 CAPSULE ORAL
Qty: 13 CAPSULE | Refills: 1 | Status: SHIPPED | OUTPATIENT
Start: 2024-02-02

## 2024-02-02 NOTE — TELEPHONE ENCOUNTER
From: Susan Bustos  To: Flaco Lang  Sent: 2/2/2024 12:11 PM EST  Subject: Refill    I sent a message to request a refill for Synthroid but the message I got back said they sent vit d can you please fix it   Thank you!

## 2024-02-21 DIAGNOSIS — F41.9 ANXIETY DISORDER, UNSPECIFIED TYPE: ICD-10-CM

## 2024-02-21 DIAGNOSIS — G47.00 INSOMNIA, UNSPECIFIED TYPE: ICD-10-CM

## 2024-02-21 DIAGNOSIS — M79.7 FIBROMYALGIA: ICD-10-CM

## 2024-02-21 DIAGNOSIS — G43.909 MIGRAINE WITHOUT STATUS MIGRAINOSUS, NOT INTRACTABLE, UNSPECIFIED MIGRAINE TYPE: ICD-10-CM

## 2024-02-22 ENCOUNTER — PATIENT MESSAGE (OUTPATIENT)
Dept: INTERNAL MEDICINE | Facility: CLINIC | Age: 49
End: 2024-02-22
Payer: COMMERCIAL

## 2024-02-22 DIAGNOSIS — F41.9 ANXIETY DISORDER, UNSPECIFIED TYPE: ICD-10-CM

## 2024-02-22 RX ORDER — ZOLPIDEM TARTRATE 10 MG/1
10 TABLET ORAL
Qty: 90 TABLET | Refills: 1 | OUTPATIENT
Start: 2024-02-22

## 2024-02-22 RX ORDER — LORAZEPAM 1 MG/1
1 TABLET ORAL EVERY 8 HOURS PRN
Qty: 90 TABLET | OUTPATIENT
Start: 2024-02-22

## 2024-02-22 RX ORDER — TRAMADOL HYDROCHLORIDE 50 MG/1
50 TABLET ORAL EVERY 8 HOURS PRN
Qty: 270 TABLET | Refills: 0 | Status: SHIPPED | OUTPATIENT
Start: 2024-02-22

## 2024-02-22 RX ORDER — ZOLPIDEM TARTRATE 10 MG/1
10 TABLET ORAL
Qty: 90 TABLET | Refills: 1 | Status: SHIPPED | OUTPATIENT
Start: 2024-02-22

## 2024-02-22 RX ORDER — ATOGEPANT 60 MG/1
60 TABLET ORAL DAILY
Qty: 90 TABLET | Refills: 3 | Status: SHIPPED | OUTPATIENT
Start: 2024-02-22

## 2024-02-22 NOTE — TELEPHONE ENCOUNTER
LAST APPOINTMENT: 01/05/2024  NEXT APPOINTMENT:07/26/2024  LAST UDS: 12/09/2022  LAST CONTROLLED SUBSTANCE AGREEMENT: 10/18/2023    The two refused were sent on a different request

## 2024-02-22 NOTE — TELEPHONE ENCOUNTER
From: Susan Bustos  To: Flaco Lang  Sent: 2/22/2024 10:24 AM EST  Subject: Meds     Sorry I don’t know why those refill requests went through twice !! I apologize!! Can you do a 90 supply of my Ativan like the rest of meds so I don’t have to bother you every month? Thank you! Have a wonderful day!!~

## 2024-02-23 NOTE — TELEPHONE ENCOUNTER
LAST APPOINTMENT: 01/05/2024  NEXT APPOINTMENT: 07/26/2024  LAST UDS: 12/09/2022  LAST CONTROLLED SUBSTANCE AGREEMENT: 10/18/2023

## 2024-02-25 RX ORDER — LORAZEPAM 1 MG/1
1 TABLET ORAL EVERY 8 HOURS PRN
Qty: 90 TABLET | Refills: 0 | Status: SHIPPED | OUTPATIENT
Start: 2024-02-25

## 2024-03-27 ENCOUNTER — TELEMEDICINE (OUTPATIENT)
Dept: INTERNAL MEDICINE | Facility: CLINIC | Age: 49
End: 2024-03-27
Payer: COMMERCIAL

## 2024-03-27 DIAGNOSIS — J01.90 ACUTE SINUSITIS, RECURRENCE NOT SPECIFIED, UNSPECIFIED LOCATION: Primary | ICD-10-CM

## 2024-03-27 DIAGNOSIS — M54.41 ACUTE MIDLINE LOW BACK PAIN WITH RIGHT-SIDED SCIATICA: ICD-10-CM

## 2024-03-27 PROCEDURE — 99213 OFFICE O/P EST LOW 20 MIN: CPT | Performed by: NURSE PRACTITIONER

## 2024-03-27 RX ORDER — AMOXICILLIN AND CLAVULANATE POTASSIUM 875; 125 MG/1; MG/1
1 TABLET, FILM COATED ORAL 2 TIMES DAILY
Qty: 14 TABLET | Refills: 0 | Status: SHIPPED | OUTPATIENT
Start: 2024-03-27 | End: 2024-04-03

## 2024-03-27 RX ORDER — AZELASTINE HYDROCHLORIDE, FLUTICASONE PROPIONATE 137; 50 UG/1; UG/1
1 SPRAY, METERED NASAL DAILY
Qty: 23 G | Refills: 0 | Status: SHIPPED | OUTPATIENT
Start: 2024-03-27

## 2024-03-27 NOTE — PROGRESS NOTES
Chief Complaint  No chief complaint on file.  Mode of Visit: Video  Location of patient: Home  Location of provider: [facility] [place] [facility] PAUL TAYLOR  [facility]  [address] [address]  [place] [address]  Dept: [phone]  Dept Fax: [phone]  Loc: [phone]  Loc Fax: [phone]  You have chosen to receive care through a telehealth visit.  Does the patient consent to use a video/audio connection for your medical care today? YES  The visit included audio and video interaction. No technical issues occurred during this visit.      Subjective      Susan Bustos is a 49 year old female that presents to [facility]  with c/o sinus congestion, facial pressure and teeth pain. She denies any fever or chills. Symptoms started 1 week ago and have progressively worsened. She has been taking Dayquil, mucinex, alkaseltzer and phenylephrine without relief.    She also c/o back pain for 3 weeks that is worsening. Has used lidocaine patches, heat. Pain in the mid lower back and radiates down the right leg.Ok when sitting, worse when stands up. No injury or trauma but has done some twisting and lifting at work.   No previous imaging on the back. She denies loss of bowel or bladder or saddle anesthesia.     History of Present Illness    Current Outpatient Medications   Medication Instructions    albuterol sulfate  (90 Base) MCG/ACT inhaler 2 puffs, Inhalation, Every 4 Hours PRN    amoxicillin-clavulanate (AUGMENTIN) 875-125 MG per tablet 1 tablet, Oral, 2 Times Daily    aspirin 81 mg, Oral, Daily    Azelastine-Fluticasone 137-50 MCG/ACT suspension 1 spray, Nasal, Daily    Chlorcyclizine-Pseudoephed 25-60 MG tablet 1 tablet, Oral, 3 times daily    estradiol (MINIVELLE, VIVELLE-DOT) 0.05 MG/24HR patch 1 patch, Transdermal, 2 Times Weekly    hydroCHLOROthiazide 25 mg, Oral, Daily    hyoscyamine (LEVSIN) 0.125 mg, Oral, Every 4  Hours PRN    levothyroxine (SYNTHROID) 25 mcg, Oral, Daily    LORazepam (ATIVAN) 1 mg, Oral, Every 8 Hours PRN, for anxiety    multivitamin with minerals (MULTIVITAMIN ADULTS 50+ PO) 1 tablet, Oral, Daily    Qulipta 60 mg, Oral, Daily    topiramate (TOPAMAX) 50 mg, Oral, 2 Times Daily    traMADol (ULTRAM) 50 mg, Oral, Every 8 Hours PRN    Ubrelvy 100 mg, Oral, See Admin Instructions    vitamin D (ERGOCALCIFEROL) 50,000 Units, Oral, Every 7 Days    zolpidem (AMBIEN) 10 mg, Oral, Every Night at Bedtime       The following portions of the patient's history were reviewed and updated as appropriate: allergies, current medications, past family history, past medical history, past social history, past surgical history, and problem list.    Objective   Vital Signs:   There were no vitals taken for this visit.    Wt Readings from Last 3 Encounters:   01/05/24 51.8 kg (114 lb 3.2 oz)   11/29/23 52 kg (114 lb 10.2 oz)   08/01/23 49.4 kg (109 lb)     BP Readings from Last 3 Encounters:   01/05/24 133/89   11/29/23 132/77   08/01/23 137/88     Physical Exam  Constitutional:       Appearance: Normal appearance.   HENT:      Head: Normocephalic and atraumatic.      Nose: Congestion present.   Eyes:      Conjunctiva/sclera: Conjunctivae normal.   Pulmonary:      Effort: Pulmonary effort is normal.   Neurological:      Mental Status: She is alert and oriented to person, place, and time.   Psychiatric:         Mood and Affect: Mood normal.         Behavior: Behavior normal.          Result Review :  The following data was reviewed by: ROZINA Corbin on 03/27/2024:      Common labs          8/1/2023    11:39 11/29/2023    14:11   Common Labs   Glucose 79  114    BUN 12  9    Creatinine 1.16  1.07    Sodium 142  142    Potassium 4.6  4.1    Chloride 106  108    Calcium 9.7  8.8    Albumin 4.4  4.2    Total Bilirubin 0.4  0.3    Alkaline Phosphatase 100  96    AST (SGOT) 14  11    ALT (SGPT) 9  7    WBC 5.25     Hemoglobin  15.9     Hematocrit 47.0     Platelets 280     Total Cholesterol 250     Triglycerides 91     HDL Cholesterol 84     LDL Cholesterol  151         Lab Results (last 72 hours)       ** No results found for the last 72 hours. **             No Images in the past 120 days found..    Lab Results   Component Value Date    BILIRUBINUR Negative 04/05/2022       Procedures        Assessment and Plan   Diagnoses and all orders for this visit:    1. Acute sinusitis, recurrence not specified, unspecified location (Primary)  -     Azelastine-Fluticasone 137-50 MCG/ACT suspension; 1 spray into the nostril(s) as directed by provider Daily.  Dispense: 23 g; Refill: 0  -     Chlorcyclizine-Pseudoephed 25-60 MG tablet; Take 1 tablet by mouth 3 times a day.  Dispense: 21 tablet; Refill: 0  -     amoxicillin-clavulanate (AUGMENTIN) 875-125 MG per tablet; Take 1 tablet by mouth 2 (Two) Times a Day for 7 days.  Dispense: 14 tablet; Refill: 0    2. Acute midline low back pain with right-sided sciatica  -     XR Spine Lumbar 2 or 3 View; Future        BMI is within normal parameters. No other follow-up for BMI required.         There are no discontinued medications.       Follow Up   No follow-ups on file.  Patient was given instructions and counseling regarding her condition or for health maintenance advice. Please see specific information pulled into the AVS if appropriate.       Vanessa Finch, APRN  03/27/24  19:35 EDT

## 2024-03-29 ENCOUNTER — PRIOR AUTHORIZATION (OUTPATIENT)
Dept: INTERNAL MEDICINE | Facility: CLINIC | Age: 49
End: 2024-03-29
Payer: COMMERCIAL

## 2024-04-03 DIAGNOSIS — F41.9 ANXIETY DISORDER, UNSPECIFIED TYPE: ICD-10-CM

## 2024-04-03 RX ORDER — LORAZEPAM 1 MG/1
1 TABLET ORAL EVERY 8 HOURS PRN
Qty: 90 TABLET | Refills: 0 | Status: SHIPPED | OUTPATIENT
Start: 2024-04-03

## 2024-04-03 NOTE — TELEPHONE ENCOUNTER
LAST APPOINTMENT: 02/08/2023  NEXT APPOINTMENT: 07/26/2024  LAST UDS: 12/09/2022  LAST CONTROLLED SUBSTANCE AGREEMENT: 12/09/2022

## 2024-04-12 ENCOUNTER — PATIENT MESSAGE (OUTPATIENT)
Dept: INTERNAL MEDICINE | Facility: CLINIC | Age: 49
End: 2024-04-12
Payer: COMMERCIAL

## 2024-04-12 DIAGNOSIS — J01.90 ACUTE SINUSITIS, RECURRENCE NOT SPECIFIED, UNSPECIFIED LOCATION: ICD-10-CM

## 2024-04-19 ENCOUNTER — LAB REQUISITION (OUTPATIENT)
Dept: LAB | Facility: HOSPITAL | Age: 49
End: 2024-04-19
Payer: COMMERCIAL

## 2024-04-19 DIAGNOSIS — R30.0 DYSURIA: ICD-10-CM

## 2024-04-19 PROCEDURE — 87086 URINE CULTURE/COLONY COUNT: CPT | Performed by: OBSTETRICS & GYNECOLOGY

## 2024-04-21 LAB — BACTERIA SPEC AEROBE CULT: NORMAL

## 2024-05-06 DIAGNOSIS — F41.9 ANXIETY DISORDER, UNSPECIFIED TYPE: ICD-10-CM

## 2024-05-07 RX ORDER — LORAZEPAM 1 MG/1
1 TABLET ORAL EVERY 8 HOURS PRN
Qty: 90 TABLET | Refills: 0 | Status: SHIPPED | OUTPATIENT
Start: 2024-05-07

## 2024-05-16 ENCOUNTER — PATIENT MESSAGE (OUTPATIENT)
Dept: INTERNAL MEDICINE | Facility: CLINIC | Age: 49
End: 2024-05-16
Payer: COMMERCIAL

## 2024-05-16 DIAGNOSIS — G43.909 MIGRAINE WITHOUT STATUS MIGRAINOSUS, NOT INTRACTABLE, UNSPECIFIED MIGRAINE TYPE: Primary | ICD-10-CM

## 2024-05-16 DIAGNOSIS — J01.90 ACUTE SINUSITIS, RECURRENCE NOT SPECIFIED, UNSPECIFIED LOCATION: ICD-10-CM

## 2024-05-21 RX ORDER — UBROGEPANT 100 MG/1
100 TABLET ORAL SEE ADMIN INSTRUCTIONS
Qty: 30 TABLET | Refills: 1 | Status: SHIPPED | OUTPATIENT
Start: 2024-05-21

## 2024-05-21 NOTE — TELEPHONE ENCOUNTER
From: Susan Bustos  To: Flaco Lang  Sent: 5/16/2024 2:56 PM EDT  Subject: Precerts     Sorry to bother you again but [facility] says they can’t get ahold of anyone at the office about my qulipta precert… seems odd and just checking to see if u sent the ubrelvy again I’m sure it will need precerted too but rep seems to think it will work now who knows I hate ins companies!! Thank you~ have a great day~

## 2024-05-22 ENCOUNTER — PRIOR AUTHORIZATION (OUTPATIENT)
Dept: INTERNAL MEDICINE | Facility: CLINIC | Age: 49
End: 2024-05-22
Payer: COMMERCIAL

## 2024-05-22 NOTE — TELEPHONE ENCOUNTER
DENIED    INDEXED DENIAL LETTER    APPEAL LETTER HAS BEEN WRITTEN AND PLACED IN PROVIDER FOLDER TO SIGN.

## 2024-06-05 DIAGNOSIS — F41.9 ANXIETY DISORDER, UNSPECIFIED TYPE: ICD-10-CM

## 2024-06-05 DIAGNOSIS — G43.909 MIGRAINE WITHOUT STATUS MIGRAINOSUS, NOT INTRACTABLE, UNSPECIFIED MIGRAINE TYPE: ICD-10-CM

## 2024-06-05 DIAGNOSIS — M79.7 FIBROMYALGIA: ICD-10-CM

## 2024-06-05 RX ORDER — LORAZEPAM 1 MG/1
1 TABLET ORAL EVERY 8 HOURS PRN
Qty: 90 TABLET | Refills: 0 | Status: CANCELLED | OUTPATIENT
Start: 2024-06-05

## 2024-06-06 RX ORDER — LORAZEPAM 1 MG/1
1 TABLET ORAL EVERY 8 HOURS PRN
Qty: 90 TABLET | Refills: 0 | Status: SHIPPED | OUTPATIENT
Start: 2024-06-06

## 2024-06-06 RX ORDER — TRAMADOL HYDROCHLORIDE 50 MG/1
50 TABLET ORAL EVERY 8 HOURS PRN
Qty: 270 TABLET | Refills: 0 | Status: SHIPPED | OUTPATIENT
Start: 2024-06-06

## 2024-06-06 NOTE — TELEPHONE ENCOUNTER
Refill request for controlled substance.      Date of request: 6/6/2024  Medication requested: Ativan  (Lorazepam)  Last OV: 3/29/24  Last UDS: 12/9/22  Contract signed: yes    Date:10/18/23  Next office visit: 7/26/24    Yulia Talley

## 2024-06-28 ENCOUNTER — PATIENT MESSAGE (OUTPATIENT)
Dept: INTERNAL MEDICINE | Facility: CLINIC | Age: 49
End: 2024-06-28
Payer: COMMERCIAL

## 2024-06-28 ENCOUNTER — TRANSCRIBE ORDERS (OUTPATIENT)
Dept: INTERNAL MEDICINE | Facility: CLINIC | Age: 49
End: 2024-06-28
Payer: COMMERCIAL

## 2024-06-28 DIAGNOSIS — Z12.31 SCREENING MAMMOGRAM, ENCOUNTER FOR: Primary | ICD-10-CM

## 2024-06-28 DIAGNOSIS — M81.0 AGE-RELATED OSTEOPOROSIS WITHOUT CURRENT PATHOLOGICAL FRACTURE: Primary | ICD-10-CM

## 2024-06-28 NOTE — TELEPHONE ENCOUNTER
From: Susan Bustos  To: Flaco Yatesfrida  Sent: 6/28/2024 9:37 AM EDT  Subject: Meds    1) last time I was in we discussed my next osteoporosis injection. I explained because of the billing I wasn’t going back to [facility]. You said the girls could look into seeing if they could order it from a specialty pharmacy and have it shipped to you all or me. I think I was due in June or July. Please let me know my next appt is end of July.  2) has all measures be exhausted for the ubrelvy precert? I’ve been using our samples and it makes me so mad it works when I have a bad one ! I hate insurance companies lol!   Thank you so much and have a wonderful weekend!!~

## 2024-06-30 DIAGNOSIS — J01.90 ACUTE SINUSITIS, RECURRENCE NOT SPECIFIED, UNSPECIFIED LOCATION: ICD-10-CM

## 2024-07-02 RX ORDER — CHLORCYCLIZINE HYDROCHLORIDE AND PSEUDOEPHEDRINE HYDROCHLORIDE 25; 60 MG/1; MG/1
1 TABLET ORAL 3 TIMES DAILY
Qty: 21 TABLET | Refills: 0 | Status: SHIPPED | OUTPATIENT
Start: 2024-07-02

## 2024-07-19 DIAGNOSIS — F41.9 ANXIETY DISORDER, UNSPECIFIED TYPE: ICD-10-CM

## 2024-07-22 RX ORDER — LORAZEPAM 1 MG/1
1 TABLET ORAL EVERY 8 HOURS PRN
Qty: 90 TABLET | Refills: 0 | Status: SHIPPED | OUTPATIENT
Start: 2024-07-22 | End: 2024-07-26 | Stop reason: SDUPTHER

## 2024-07-22 NOTE — TELEPHONE ENCOUNTER
Refill request for controlled substance.      Date of request: 7/22/2024    Medication requested: Ativan  (Lorazepam)  Last OV: 3/27/24- telemedicine  Last UDS: 12/9/22  Contract signed: yes    Date:12/9/22  Next office visit: 7/26/24    Yulia Talley

## 2024-07-24 DIAGNOSIS — G43.909 MIGRAINE WITHOUT STATUS MIGRAINOSUS, NOT INTRACTABLE, UNSPECIFIED MIGRAINE TYPE: ICD-10-CM

## 2024-07-24 RX ORDER — TOPIRAMATE 50 MG/1
50 TABLET, FILM COATED ORAL 2 TIMES DAILY
Qty: 180 TABLET | Refills: 1 | Status: SHIPPED | OUTPATIENT
Start: 2024-07-24

## 2024-07-26 ENCOUNTER — PATIENT MESSAGE (OUTPATIENT)
Dept: INTERNAL MEDICINE | Facility: CLINIC | Age: 49
End: 2024-07-26
Payer: COMMERCIAL

## 2024-07-26 ENCOUNTER — OFFICE VISIT (OUTPATIENT)
Dept: INTERNAL MEDICINE | Facility: CLINIC | Age: 49
End: 2024-07-26
Payer: COMMERCIAL

## 2024-07-26 VITALS
WEIGHT: 111.6 LBS | SYSTOLIC BLOOD PRESSURE: 145 MMHG | BODY MASS INDEX: 21.91 KG/M2 | HEIGHT: 60 IN | DIASTOLIC BLOOD PRESSURE: 87 MMHG | OXYGEN SATURATION: 98 % | HEART RATE: 88 BPM | TEMPERATURE: 97 F

## 2024-07-26 DIAGNOSIS — R03.0 ELEVATED BLOOD PRESSURE READING: ICD-10-CM

## 2024-07-26 DIAGNOSIS — Z12.11 COLON CANCER SCREENING: ICD-10-CM

## 2024-07-26 DIAGNOSIS — E03.9 ACQUIRED HYPOTHYROIDISM: Primary | ICD-10-CM

## 2024-07-26 DIAGNOSIS — F41.9 ANXIETY DISORDER, UNSPECIFIED TYPE: ICD-10-CM

## 2024-07-26 DIAGNOSIS — E78.2 MIXED HYPERLIPIDEMIA: ICD-10-CM

## 2024-07-26 PROCEDURE — 99214 OFFICE O/P EST MOD 30 MIN: CPT | Performed by: INTERNAL MEDICINE

## 2024-07-26 RX ORDER — LORAZEPAM 1 MG/1
1 TABLET ORAL EVERY 8 HOURS PRN
Qty: 270 TABLET | Refills: 0 | Status: SHIPPED | OUTPATIENT
Start: 2024-08-20

## 2024-07-26 NOTE — PROGRESS NOTES
"Chief Complaint  Hypertension    Subjective          Susan Bustos presents to [facility]  History of Present Illness  Hypertension- patient denies headaches, dizziness, chest pain. Patient reports home readings have been <130/80.   Hyperlipidemia- doing well on aspirin.   Hypothyroid- patient has stopped thyroid medication and feels well.   Sleep- patient doing well on ambien  Anxiety- patient doing well on ativan.       Current Outpatient Medications   Medication Instructions    albuterol sulfate  (90 Base) MCG/ACT inhaler 2 puffs, Inhalation, Every 4 Hours PRN    aspirin 81 mg, Oral, Daily    Azelastine-Fluticasone 137-50 MCG/ACT suspension 1 spray, Nasal, Daily    estradiol (MINIVELLE, VIVELLE-DOT) 0.05 MG/24HR patch 1 patch, Transdermal, 2 Times Weekly    hyoscyamine (LEVSIN) 0.125 mg, Oral, Every 4 Hours PRN    [START ON 8/20/2024] LORazepam (ATIVAN) 1 mg, Oral, Every 8 Hours PRN, for anxiety    multivitamin with minerals (MULTIVITAMIN ADULTS 50+ PO) 1 tablet, Oral, Daily    Qulipta 60 mg, Oral, Daily    Stahist AD 25-60 MG tablet 1 tablet, Oral, 3 Times Daily    topiramate (TOPAMAX) 50 mg, Oral, 2 Times Daily    traMADol (ULTRAM) 50 mg, Oral, Every 8 Hours PRN    Ubrelvy 100 mg, Oral, See Admin Instructions    vitamin D (ERGOCALCIFEROL) 50,000 Units, Oral, Every 7 Days    zolpidem (AMBIEN) 10 mg, Oral, Every Night at Bedtime       The following portions of the patient's history were reviewed and updated as appropriate: allergies, current medications, past family history, past medical history, past social history, past surgical history, and problem list.    Objective   Vital Signs:   /87 (BP Location: Left arm, Patient Position: Sitting, Cuff Size: Adult)   Pulse 88   Temp 97 °F (36.1 °C) (Temporal)   Ht 152.4 cm (60\")   Wt 50.6 kg (111 lb 9.6 oz)   SpO2 98%   BMI 21.80 kg/m²     BP Readings from Last 3 Encounters:   07/26/24 145/87 "   01/05/24 133/89   11/29/23 132/77     Wt Readings from Last 3 Encounters:   07/26/24 50.6 kg (111 lb 9.6 oz)   01/05/24 51.8 kg (114 lb 3.2 oz)   11/29/23 52 kg (114 lb 10.2 oz)     BMI is within normal parameters. No other follow-up for BMI required.     Physical Exam   Appearance: No acute distress, well-nourished  Head: normocephalic, atraumatic  Eyes: extraocular movements intact, no scleral icterus, no conjunctival injection  Ears, Nose, and Throat: external ears normal, nares patent, moist mucous membranes  Cardiovascular: regular rate and rhythm. no murmurs, rubs, or gallops. no edema  Respiratory: breathing comfortably, symmetric chest rise, clear to auscultation bilaterally. No wheezes, rales, or rhonchi.  Neuro: alert and oriented to time, place, and person. Normal gait  Psych: normal mood and affect     Result Review :   The following data was reviewed by: Flaco Lang Jr, MD on 07/26/2024:  Common labs          11/29/2023    14:11   Common Labs   Glucose 114    BUN 9    Creatinine 1.07    Sodium 142    Potassium 4.1    Chloride 108    Calcium 8.8    Albumin 4.2    Total Bilirubin 0.3    Alkaline Phosphatase 96    AST (SGOT) 11    ALT (SGPT) 7        Lab Results   Component Value Date    BILIRUBINUR Negative 04/05/2022     Last Urine Toxicity  More data may exist         Latest Ref Rng & Units 12/9/2022 3/11/2022   LAST URINE TOXICITY RESULTS   Amphetamine, Urine Qual Negative Negative  Negative    Barbiturates Screen, Urine Negative Negative  Negative    Benzodiazepine Screen, Urine Negative Positive  Positive    Buprenorphine, Screen, Urine Negative Negative  Negative    Cocaine Screen, Urine Negative Negative  Negative    Methadone Screen , Urine Negative Negative  Negative    Methamphetamine, Ur Negative Negative  Negative       Details                   Assessment and Plan    Diagnoses and all orders for this visit:    1. Acquired hypothyroidism (Primary)  Comments:  pt now off  synthroid. will recheck levels with next draw    2. Mixed hyperlipidemia  Comments:  monitor with labs. discussed coronary calcium score if pt would like to pursue.    3. Anxiety disorder, unspecified type  Comments:  cont ativan prn. KELLY and bernarda reviewed  Orders:  -     LORazepam (ATIVAN) 1 MG tablet; Take 1 tablet by mouth Every 8 (Eight) Hours As Needed for Anxiety. for anxiety  Dispense: 270 tablet; Refill: 0    4. Colon cancer screening  -     Ambulatory Referral For Screening Colonoscopy    5. Elevated blood pressure reading  Comments:  vont monitoring outside the office. goal bP <130/80        Medications Discontinued During This Encounter   Medication Reason    levothyroxine (Synthroid) 25 MCG tablet *Therapy completed    hydroCHLOROthiazide (HYDRODIURIL) 25 MG tablet *Therapy completed    denosumab (PROLIA) 60 MG/ML solution prefilled syringe syringe *Therapy completed    LORazepam (ATIVAN) 1 MG tablet Reorder        Follow Up   Return in about 6 months (around 1/26/2025) for mental health.  Patient was given instructions and counseling regarding her condition or for health maintenance advice. Please see specific information pulled into the AVS if appropriate.       Flaco Lang Jr, MD  07/26/24  13:05 EDT

## 2024-07-31 ENCOUNTER — PRIOR AUTHORIZATION (OUTPATIENT)
Dept: INTERNAL MEDICINE | Facility: CLINIC | Age: 49
End: 2024-07-31
Payer: COMMERCIAL

## 2024-08-01 NOTE — TELEPHONE ENCOUNTER
Can we call and notify patient.  Does insurance report a covered alternative?  
PA REQUIRED FOR FOLLOWING MEDICATION:    denosumab (PROLIA) 60 MG/ML solution prefilled syringe syringe (07/03/2024)     INDEXED VIA ONBASE  
PA STARTED: 7/31/2024    COVER MY MEDS KEY: [id number]    WAITING ON INSURANCE REPLY    
Per CMM:    This request cannot be processed due to the medication is not covered by the plan.  
hand pain after punching wall, pt was frustrated, punch wall with R hand now with R wrist and R hand pain, 5/10, non-radiating, throbbing, worse with movement, no weakness/numbness or other injuries

## 2024-08-09 ENCOUNTER — TELEPHONE (OUTPATIENT)
Dept: INTERNAL MEDICINE | Facility: CLINIC | Age: 49
End: 2024-08-09
Payer: COMMERCIAL

## 2024-08-09 NOTE — TELEPHONE ENCOUNTER
From: Yulia CROCKETT  To: Susan Bustos  Sent: 7/26/2024 9:51 AM EDT  Subject: Prolia    Good morning!    Dr. Lang had told us you were having trouble getting the Polia, the phone number to the [facility] is [phone]. You may have better luck by calling them, they typically are supposed to call you.     You can also chose to get it shipped to your home and administer it yourself or to the office and we administer it for you. Whichever you would like!    Please let us know if this does not get resolved by you calling them.    Thank you,  Yulia RICK

## 2024-08-09 NOTE — TELEPHONE ENCOUNTER
Spoke with Ludy at [facility]- stated that medication is set and scheduled to be sent to [facility] in [facility].    No further questions or concerns.

## 2024-08-09 NOTE — TELEPHONE ENCOUNTER
Caller: [facility] - [place], [place] - [address] [facility] - [phone]  - [phone] FX    Relationship to patient: Pharmacy      Patient is needing: NEEDING CLARIFICATION ON WHERE PATIENT'S PROLIA INJECTION WILL BE SHIPPED TO.

## 2024-08-15 ENCOUNTER — CLINICAL SUPPORT (OUTPATIENT)
Dept: GASTROENTEROLOGY | Facility: CLINIC | Age: 49
End: 2024-08-15
Payer: COMMERCIAL

## 2024-08-15 ENCOUNTER — PREP FOR SURGERY (OUTPATIENT)
Dept: OTHER | Facility: HOSPITAL | Age: 49
End: 2024-08-15
Payer: COMMERCIAL

## 2024-08-15 DIAGNOSIS — Z12.11 SCREENING FOR MALIGNANT NEOPLASM OF COLON: Primary | ICD-10-CM

## 2024-08-15 RX ORDER — POLYETHYLENE GLYCOL 3350, SODIUM SULFATE, SODIUM CHLORIDE, POTASSIUM CHLORIDE, ASCORBIC ACID, SODIUM ASCORBATE 140-9-5.2G
1 KIT ORAL TAKE AS DIRECTED
Qty: 1 EACH | Refills: 0 | Status: SHIPPED | OUTPATIENT
Start: 2024-08-15 | End: 2024-08-16

## 2024-08-15 NOTE — PROGRESS NOTES
Susan Bustos  1975  49 y.o.    Reason for call: Screening Colonoscopy    Prep prescribed: Plenvu  Prep instructions reviewed with patient and sent to patient via regular mail to the home address on file    Clearance needed? NO    Is the patient currently on any injectable medications for weight loss or diabetes? NO      Family history of colon cancer? No  If yes, indicate relative:     The patient has been scheduled for: Colonoscopy  Procedure Date: 10.9.24  Family History   Problem Relation Age of Onset    Arthritis Mother     Alcohol abuse Mother     Stroke Father     Heart disease Father     Diabetes Father     Osteoporosis Father     Hyperlipidemia Father     Kidney disease Father     Hyperlipidemia Brother     Cancer Daughter     Colon cancer Neg Hx      Past Medical History:   Diagnosis Date    Allergic     Anxiety disorder 10/28/2018    Colitis     Factor II deficiency 2019    Fibromyalgia 10/26/2018    Fibromyalgia, primary     Headache     History of medical problems     Sphincter of ode spasms after gallbladder surgery    Hyperlipidemia     Labs    Hypothyroidism 2021    Insomnia 10/26/2018    Kidney disease     Kidney stone     Migraine     Osteopenia     Had first prolia inj 2023    Osteoporosis 2019    Thyroid disease     Vitamin D deficiency 2019     No Known Allergies  Past Surgical History:   Procedure Laterality Date     SECTION  92,95    CHOLECYSTECTOMY      COLONOSCOPY      CYSTOSCOPY      HYSTERECTOMY  2010    KIDNEY SURGERY      OTHER SURGICAL HISTORY      JOINT SURGERY    OTHER SURGICAL HISTORY      LITHOTRIPSY     Social History     Socioeconomic History    Marital status:    Tobacco Use    Smoking status: Never    Smokeless tobacco: Never   Vaping Use    Vaping status: Never Used   Substance and Sexual Activity    Alcohol use: Not Currently     Comment: has been 2-3 years since last drink    Drug use: Never    Sexual activity: Yes      Partners: Male     Birth control/protection: Hysterectomy       Current Outpatient Medications:     albuterol sulfate  (90 Base) MCG/ACT inhaler, Inhale 2 puffs Every 4 (Four) Hours As Needed for Wheezing., Disp: 18 g, Rfl: 5    aspirin 81 MG chewable tablet, Chew 1 tablet Daily., Disp: , Rfl:     Azelastine-Fluticasone 137-50 MCG/ACT suspension, 1 spray into the nostril(s) as directed by provider Daily., Disp: 23 g, Rfl: 0    estradiol (MINIVELLE, VIVELLE-DOT) 0.05 MG/24HR patch, PLACE 1 PATCH ON THE SKIN AS DIRECTED BY PROVIDER 2 (TWO) TIMES A WEEK., Disp: 24 patch, Rfl: 2    hyoscyamine (LEVSIN) 0.125 MG SL tablet, Take 1 tablet by mouth Every 4 (Four) Hours As Needed for Cramping., Disp: 60 tablet, Rfl: 5    [START ON 8/20/2024] LORazepam (ATIVAN) 1 MG tablet, Take 1 tablet by mouth Every 8 (Eight) Hours As Needed for Anxiety. for anxiety, Disp: 270 tablet, Rfl: 0    multivitamin with minerals (MULTIVITAMIN ADULTS 50+ PO), Take 1 tablet by mouth Daily., Disp: , Rfl:     Qulipta 60 MG tablet, TAKE 1 TABLET BY MOUTH EVERY DAY, Disp: 90 tablet, Rfl: 3    Stahist AD 25-60 MG tablet, TAKE 1 TABLET BY MOUTH THREE TIMES A DAY, Disp: 21 tablet, Rfl: 0    topiramate (TOPAMAX) 50 MG tablet, TAKE 1 TABLET BY MOUTH TWICE A DAY, Disp: 180 tablet, Rfl: 1    traMADol (ULTRAM) 50 MG tablet, Take 1 tablet by mouth Every 8 (Eight) Hours As Needed for Moderate Pain., Disp: 270 tablet, Rfl: 0    Ubrelvy 100 MG tablet, Take 1 tablet by mouth See Admin Instructions., Disp: 30 tablet, Rfl: 1    vitamin D (ERGOCALCIFEROL) 1.25 MG (93066 UT) capsule capsule, TAKE 1 CAPSULE BY MOUTH 1 TIME PER WEEK., Disp: 13 capsule, Rfl: 1    zolpidem (AMBIEN) 10 MG tablet, TAKE 1 TABLET BY MOUTH EVERYDAY AT BEDTIME, Disp: 90 tablet, Rfl: 1

## 2024-08-19 DIAGNOSIS — G47.00 INSOMNIA, UNSPECIFIED TYPE: ICD-10-CM

## 2024-08-19 DIAGNOSIS — Z79.890 HORMONE REPLACEMENT THERAPY (HRT): ICD-10-CM

## 2024-08-19 RX ORDER — ESTRADIOL 0.05 MG/D
1 PATCH, EXTENDED RELEASE TRANSDERMAL 2 TIMES WEEKLY
Qty: 24 PATCH | Refills: 2 | Status: SHIPPED | OUTPATIENT
Start: 2024-08-19

## 2024-08-19 NOTE — TELEPHONE ENCOUNTER
Refill request for controlled substance.      Date of request: 8/19/2024    Medication requested: Ambien  (Zolpidem)  Last OV: 7/26/24  Last UDS: 12/19/22  Contract signed: yes    Date:12/9/22  Next office visit: 1/10/25    Yulia Talley

## 2024-08-20 RX ORDER — ZOLPIDEM TARTRATE 10 MG/1
10 TABLET ORAL
Qty: 90 TABLET | Refills: 1 | Status: SHIPPED | OUTPATIENT
Start: 2024-08-20

## 2024-08-30 ENCOUNTER — HOSPITAL ENCOUNTER (OUTPATIENT)
Dept: MAMMOGRAPHY | Facility: HOSPITAL | Age: 49
Discharge: HOME | End: 2024-08-30
Admitting: INTERNAL MEDICINE
Payer: COMMERCIAL

## 2024-08-30 DIAGNOSIS — Z12.31 SCREENING MAMMOGRAM, ENCOUNTER FOR: ICD-10-CM

## 2024-08-30 PROCEDURE — 77063 BREAST TOMOSYNTHESIS BI: CPT

## 2024-08-30 PROCEDURE — 77067 SCR MAMMO BI INCL CAD: CPT

## 2024-09-16 DIAGNOSIS — M79.7 FIBROMYALGIA: ICD-10-CM

## 2024-09-16 DIAGNOSIS — G43.909 MIGRAINE WITHOUT STATUS MIGRAINOSUS, NOT INTRACTABLE, UNSPECIFIED MIGRAINE TYPE: ICD-10-CM

## 2024-09-17 RX ORDER — TRAMADOL HYDROCHLORIDE 50 MG/1
50 TABLET ORAL EVERY 8 HOURS PRN
Qty: 270 TABLET | Refills: 0 | Status: SHIPPED | OUTPATIENT
Start: 2024-09-17

## 2024-09-29 DIAGNOSIS — J01.90 ACUTE SINUSITIS, RECURRENCE NOT SPECIFIED, UNSPECIFIED LOCATION: ICD-10-CM

## 2024-09-30 RX ORDER — CHLORCYCLIZINE HYDROCHLORIDE AND PSEUDOEPHEDRINE HYDROCHLORIDE 25; 60 MG/1; MG/1
1 TABLET ORAL 3 TIMES DAILY
Qty: 21 TABLET | Refills: 0 | Status: SHIPPED | OUTPATIENT
Start: 2024-09-30

## 2024-10-07 ENCOUNTER — ANESTHESIA EVENT (OUTPATIENT)
Dept: GASTROENTEROLOGY | Facility: HOSPITAL | Age: 49
End: 2024-10-07
Payer: COMMERCIAL

## 2024-10-07 NOTE — ANESTHESIA PREPROCEDURE EVALUATION
" Anesthesia Evaluation     Patient summary reviewed   NPO Solid Status: > 2 hours  NPO Liquid Status: > 8 hours           Airway   Mallampati: II  TM distance: >3 FB  Neck ROM: full  No difficulty expected  Dental - normal exam     Comment: Caps in rear    Pulmonary - normal exam    breath sounds clear to auscultation  (+) ,shortness of breath  Cardiovascular - normal exam    Rhythm: regular  Rate: normal    (+) hyperlipidemia    PE comment: Tachycardic - NSR on monitor in Preop. Patient states took ativan this morning but \"eats through medicine and always needs extra\"    Neuro/Psych  (+) headaches, psychiatric history (on meds prn) AnxietyParkinson's disease: has prn inhalers under meds?.  GI/Hepatic/Renal/Endo    (+) renal disease- stones, thyroid problem hypothyroidism    Musculoskeletal     (+) myalgias (has osteopenia)  Abdominal    Substance History   (+) alcohol use (no etoh for 3+ years)     OB/GYN          Other        ROS/Med Hx Other: 1.  MITRAL VALVE:       Normal.    2.  AORTIC VALVE:       Normal.    3.  TRICUSPID VALVE:    Normal.    4.  PULMONIC VALVE:     Not well visualized.    5.  AORTIC ROOT:        Normal in size.    6.  LEFT ATRIUM:        Normal.    7.  LEFT VENTRICLE:     Normal in size. Overall left ventricular systolic        function is adequate, ejection fraction around 60%.    8.  RIGHT VENTRICLE:    Normal.    9.  RIGHT ATRIUM:       Normal.    10. PERICARDIUM:        Unremarkable.         Doppler examination shows trace tricuspid regurgitation.  Bubble contrast    shows no evidence of right-to-left shunt.         CONCLUSION:    1.  Normal left ventricular systolic function.    2.  Trace tricuspid regurgitation.    3.  No evidence of right-to-left shunt.         To be electronically signed in [employer]    [id number] APARNA CHARLES M.D.         CONNER:    D:  06/05/2019 12:31        Uses inhaler for coughing spells/sinus drainage.  Lungs CTA B                      Anesthesia Plan    ASA 2 "     general   total IV anesthesia  (Total IV Anesthesia    Patient understands anesthesia not responsible for dental damage.  )  intravenous induction     Anesthetic plan, risks, benefits, and alternatives have been provided, discussed and informed consent has been obtained with: patient.    Plan discussed with CRNA.    CODE STATUS:

## 2024-10-09 ENCOUNTER — ANESTHESIA (OUTPATIENT)
Dept: GASTROENTEROLOGY | Facility: HOSPITAL | Age: 49
End: 2024-10-09
Payer: COMMERCIAL

## 2024-10-09 ENCOUNTER — HOSPITAL ENCOUNTER (OUTPATIENT)
Facility: HOSPITAL | Age: 49
Setting detail: HOSPITAL OUTPATIENT SURGERY
Discharge: HOME | End: 2024-10-09
Attending: INTERNAL MEDICINE | Admitting: INTERNAL MEDICINE
Payer: COMMERCIAL

## 2024-10-09 VITALS
BODY MASS INDEX: 21.01 KG/M2 | DIASTOLIC BLOOD PRESSURE: 69 MMHG | OXYGEN SATURATION: 100 % | SYSTOLIC BLOOD PRESSURE: 100 MMHG | HEART RATE: 78 BPM | WEIGHT: 107.58 LBS | RESPIRATION RATE: 18 BRPM | TEMPERATURE: 97.1 F

## 2024-10-09 PROCEDURE — 25810000003 LACTATED RINGERS PER 1000 ML: Performed by: NURSE ANESTHETIST, CERTIFIED REGISTERED

## 2024-10-09 PROCEDURE — 25010000002 LIDOCAINE PF 2% 2 % SOLUTION

## 2024-10-09 PROCEDURE — 25010000002 PROPOFOL 10 MG/ML EMULSION

## 2024-10-09 PROCEDURE — 25810000003 LACTATED RINGERS PER 1000 ML

## 2024-10-09 PROCEDURE — 45378 DIAGNOSTIC COLONOSCOPY: CPT | Performed by: INTERNAL MEDICINE

## 2024-10-09 RX ORDER — PROPOFOL 10 MG/ML
VIAL (ML) INTRAVENOUS AS NEEDED
Status: DISCONTINUED | OUTPATIENT
Start: 2024-10-09 | End: 2024-10-09 | Stop reason: SURG

## 2024-10-09 RX ORDER — SODIUM CHLORIDE, SODIUM LACTATE, POTASSIUM CHLORIDE, CALCIUM CHLORIDE 600; 310; 30; 20 MG/100ML; MG/100ML; MG/100ML; MG/100ML
30 INJECTION, SOLUTION INTRAVENOUS CONTINUOUS
Status: DISCONTINUED | OUTPATIENT
Start: 2024-10-09 | End: 2024-10-09 | Stop reason: HOSPADM

## 2024-10-09 RX ORDER — DEXMEDETOMIDINE HYDROCHLORIDE 100 UG/ML
INJECTION, SOLUTION INTRAVENOUS AS NEEDED
Status: DISCONTINUED | OUTPATIENT
Start: 2024-10-09 | End: 2024-10-09 | Stop reason: SURG

## 2024-10-09 RX ORDER — SODIUM CHLORIDE, SODIUM LACTATE, POTASSIUM CHLORIDE, CALCIUM CHLORIDE 600; 310; 30; 20 MG/100ML; MG/100ML; MG/100ML; MG/100ML
INJECTION, SOLUTION INTRAVENOUS CONTINUOUS PRN
Status: DISCONTINUED | OUTPATIENT
Start: 2024-10-09 | End: 2024-10-09 | Stop reason: SURG

## 2024-10-09 RX ORDER — LIDOCAINE HYDROCHLORIDE 20 MG/ML
INJECTION, SOLUTION EPIDURAL; INFILTRATION; INTRACAUDAL; PERINEURAL AS NEEDED
Status: DISCONTINUED | OUTPATIENT
Start: 2024-10-09 | End: 2024-10-09 | Stop reason: SURG

## 2024-10-09 RX ADMIN — SODIUM CHLORIDE, POTASSIUM CHLORIDE, SODIUM LACTATE AND CALCIUM CHLORIDE 30 ML/HR: 600; 310; 30; 20 INJECTION, SOLUTION INTRAVENOUS at 09:25

## 2024-10-09 RX ADMIN — PROPOFOL 250 MCG/KG/MIN: 10 INJECTION, EMULSION INTRAVENOUS at 10:17

## 2024-10-09 RX ADMIN — DEXMEDETOMIDINE HYDROCHLORIDE 10 MCG: 100 INJECTION, SOLUTION, CONCENTRATE INTRAVENOUS at 10:17

## 2024-10-09 RX ADMIN — SODIUM CHLORIDE, POTASSIUM CHLORIDE, SODIUM LACTATE AND CALCIUM CHLORIDE: 600; 310; 30; 20 INJECTION, SOLUTION INTRAVENOUS at 10:15

## 2024-10-09 RX ADMIN — LIDOCAINE HYDROCHLORIDE 100 MG: 20 INJECTION, SOLUTION INTRAVENOUS at 10:17

## 2024-10-09 RX ADMIN — PROPOFOL 100 MG: 10 INJECTION, EMULSION INTRAVENOUS at 10:17

## 2024-10-09 NOTE — H&P
Pre Procedure History & Physical    Chief Complaint:   Colon cancer screening    Subjective     HPI:   Screening colon cancer screening    Past Medical History:   Past Medical History:   Diagnosis Date    Allergic     Anxiety disorder 10/28/2018    Colitis     Factor II deficiency 2019    Fibromyalgia 10/26/2018    Fibromyalgia, primary 2007    Headache     History of medical problems     Sphincter of ode spasms after gallbladder surgery    Hyperlipidemia     Labs    Hypothyroidism 2021    Insomnia 10/26/2018    Kidney disease     Kidney stone     Migraine     Osteopenia     Had first prolia inj 2023    Osteoporosis 2019    Thyroid disease     Vitamin D deficiency 2019       Past Surgical History:  Past Surgical History:   Procedure Laterality Date     SECTION  92,95    CHOLECYSTECTOMY      COLONOSCOPY      CYSTOSCOPY      HYSTERECTOMY      KIDNEY SURGERY      OTHER SURGICAL HISTORY      JOINT SURGERY    OTHER SURGICAL HISTORY      LITHOTRIPSY       Family History:  Family History   Problem Relation Age of Onset    Arthritis Mother     Alcohol abuse Mother     Stroke Father     Heart disease Father     Diabetes Father     Osteoporosis Father     Hyperlipidemia Father     Kidney disease Father     Hyperlipidemia Brother     Cancer Daughter     Colon cancer Neg Hx        Social History:   reports that she has never smoked. She has never used smokeless tobacco. She reports that she does not currently use alcohol. She reports that she does not use drugs.    Medications:   Medications Prior to Admission   Medication Sig Dispense Refill Last Dose    albuterol sulfate  (90 Base) MCG/ACT inhaler Inhale 2 puffs Every 4 (Four) Hours As Needed for Wheezing. 18 g 5     aspirin 81 MG chewable tablet Chew 1 tablet Daily.       Azelastine-Fluticasone 137-50 MCG/ACT suspension 1 spray into the nostril(s) as directed by provider Daily. 23 g 0     estradiol (MINIVELLE, VIVELLE-DOT)  0.05 MG/24HR patch PLACE 1 PATCH ON THE SKIN AS DIRECTED BY PROVIDER 2 (TWO) TIMES A WEEK. 24 patch 2     hyoscyamine (LEVSIN) 0.125 MG SL tablet Take 1 tablet by mouth Every 4 (Four) Hours As Needed for Cramping. 60 tablet 5     LORazepam (ATIVAN) 1 MG tablet Take 1 tablet by mouth Every 8 (Eight) Hours As Needed for Anxiety. for anxiety 270 tablet 0     multivitamin with minerals (MULTIVITAMIN ADULTS 50+ PO) Take 1 tablet by mouth Daily.       Qulipta 60 MG tablet TAKE 1 TABLET BY MOUTH EVERY DAY 90 tablet 3     Stahist AD 25-60 MG tablet TAKE 1 TABLET BY MOUTH THREE TIMES A DAY 21 tablet 0     topiramate (TOPAMAX) 50 MG tablet TAKE 1 TABLET BY MOUTH TWICE A  tablet 1     traMADol (ULTRAM) 50 MG tablet TAKE 1 TABLET BY MOUTH EVERY 8 HOURS AS NEEDED FOR MODERATE PAIN 270 tablet 0     Ubrelvy 100 MG tablet Take 1 tablet by mouth See Admin Instructions. 30 tablet 1     vitamin D (ERGOCALCIFEROL) 1.25 MG (64088 UT) capsule capsule TAKE 1 CAPSULE BY MOUTH 1 TIME PER WEEK. 13 capsule 1     zolpidem (AMBIEN) 10 MG tablet TAKE 1 TABLET BY MOUTH EVERYDAY AT BEDTIME 90 tablet 1        Allergies:  Patient has no known allergies.        Objective     Weight 48.8 kg (107 lb 9.4 oz), not currently breastfeeding.    Physical Exam   Constitutional: Pt is oriented to person, place, and time and well-developed, well-nourished, and in no distress.   Mouth/Throat: Oropharynx is clear and moist.   Neck: Normal range of motion.   Cardiovascular: Normal rate, regular rhythm and normal heart sounds.    Pulmonary/Chest: Effort normal and breath sounds normal.   Abdominal: Soft. Nontender  Skin: Skin is warm and dry.   Psychiatric: Mood, memory, affect and judgment normal.     Assessment & Plan     Diagnosis:  Colon cancer screening    Anticipated Surgical Procedure:  Colonoscopy    The risks, benefits, and alternatives of this procedure have been discussed with the patient or the responsible party- the patient understands and  agrees to proceed.

## 2024-10-09 NOTE — ANESTHESIA POSTPROCEDURE EVALUATION
Patient: Susan Bustos    Procedure Summary       Date: 10/09/24 Room / Location: [facility] 3 / [facility]    Anesthesia Start: 1015 Anesthesia Stop: 1042    Procedure: COLONOSCOPY Diagnosis:       Screening for malignant neoplasm of colon      (Screening for malignant neoplasm of colon [Z12.11])    Surgeons: Ryan Ferraro MD Provider: Bee Chang CRNA    Anesthesia Type: general ASA Status: 2            Anesthesia Type: general    Vitals  Vitals Value Taken Time   /69 10/09/24 1056   Temp 36.2 °C (97.1 °F) 10/09/24 1055   Pulse 80 10/09/24 1056   Resp 18 10/09/24 1055   SpO2 99 % 10/09/24 1056   Vitals shown include unfiled device data.        Post Anesthesia Care and Evaluation    Post-procedure mental status: acceptable.  Pain management: satisfactory to patient    Airway patency: patent  Anesthetic complications: No anesthetic complications    Cardiovascular status: acceptable  Respiratory status: acceptable    Comments: Per chart review

## 2024-10-11 ENCOUNTER — TELEPHONE (OUTPATIENT)
Dept: GASTROENTEROLOGY | Facility: CLINIC | Age: 49
End: 2024-10-11
Payer: COMMERCIAL

## 2024-10-11 RX ORDER — PANTOPRAZOLE SODIUM 40 MG/1
40 TABLET, DELAYED RELEASE ORAL DAILY
Qty: 90 TABLET | Refills: 0 | Status: SHIPPED | OUTPATIENT
Start: 2024-10-11

## 2024-10-14 DIAGNOSIS — J01.90 ACUTE SINUSITIS, RECURRENCE NOT SPECIFIED, UNSPECIFIED LOCATION: ICD-10-CM

## 2024-10-15 RX ORDER — CHLORCYCLIZINE HYDROCHLORIDE AND PSEUDOEPHEDRINE HYDROCHLORIDE 25; 60 MG/1; MG/1
1 TABLET ORAL 3 TIMES DAILY
Qty: 21 TABLET | Refills: 0 | Status: SHIPPED | OUTPATIENT
Start: 2024-10-15

## 2024-10-16 ENCOUNTER — TELEPHONE (OUTPATIENT)
Dept: GASTROENTEROLOGY | Facility: CLINIC | Age: 49
End: 2024-10-16
Payer: COMMERCIAL

## 2024-10-16 DIAGNOSIS — J01.90 ACUTE SINUSITIS, RECURRENCE NOT SPECIFIED, UNSPECIFIED LOCATION: ICD-10-CM

## 2024-10-16 RX ORDER — CHLORCYCLIZINE HYDROCHLORIDE AND PSEUDOEPHEDRINE HYDROCHLORIDE 25; 60 MG/1; MG/1
1 TABLET ORAL 3 TIMES DAILY
Qty: 21 TABLET | Refills: 0 | OUTPATIENT
Start: 2024-10-16

## 2024-10-16 NOTE — TELEPHONE ENCOUNTER
Colonoscopy 10/9/2024 excellent prep, diverticula in the sigmoid, grade 1 internal hemorrhoids, repeat colon in 10 years, send letter   toSubjective:  Pauol Spencer presents for   Chief Complaint   Patient presents with    Fall     1 wk ago pt fell on left Ribs; tripped and hit ribs on dog cot- pain is radiating to back     Laryngitis     1 wk ago, almost resolved. pt states throat feels a little scratchy    Fatigue     has been feeling tired for about a week now along with losing voice.  Headache     Tripped last week and fell on a dog cot. Had a cold last week     Taking motirn 800mg  - not helping rib pain    Pain is causing sleep distuptin    No exposure to covid or flu    No gi sx    Eating and drinking ok. 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 · Respiratory: no cough, pleuritic chest pain, dyspnea, or wheezing  · Cardiovascular: no pain, RODRIGUEZ, orthopnea, palpitations or claudication  · Gastrointestinal: no chronic nausea, vomiting, heartburn, diarrhea, constipation, bloating, or abdominal pain. No bloody or black stools. Objective:  Physical Exam   Vitals: Wt Readings from Last 3 Encounters:   03/14/22 270 lb (122.5 kg)   12/13/21 276 lb (125.2 kg)   10/26/21 275 lb (124.7 kg)     Ht Readings from Last 3 Encounters:   12/13/21 5' (1.524 m)   12/01/20 5' 1\" (1.549 m)   11/27/20 5' 1\" (1.549 m)     Body mass index is 52.73 kg/m².   Vitals:    03/14/22 1504   BP: 120/80   Site: Right Upper Arm   Position: Sitting   Cuff Size: Small Adult   Pulse: 61   Temp: 97.9 °F (36.6 °C)   TempSrc: Tympanic   SpO2: 97%   Weight: 270 lb (122.5 kg)       Constitutional: She is oriented to person, place, and time. She appears well-developed and well-nourished and in no acute distress. Answers all my questions appropriately. Head: Normocephalic and atraumatic. Eyes:conjunctiva appear normal.    Right Ear: External ear normal. TM is clear  Left Ear: External ear normal. TM is clear    Nose: pink, non-edematous mucosa. No polyps. No septal deviation    Throat: no erythema, tonsillar hypertrophy or exudate. No ulcerations noted. Lips/Teeth/Gums all appear normal.    Neck: Normal range of motion. Neck supple. No tracheal deviation present. No abnormal lymphadenopathy. No JVD noted. Carotids are clear bilaterally. No thyroid masses noted. Heart: RRR without murmur. No S3, S4, or gallop noted. Chest:   Good breath sounds noted. Clear to auscultation bilaterally. No rales, wheezes, or rhonchi noted. No respiratory retractions noted. Wall has symmetrical movement with respirations. No ecchymosis noted or defromtiy over the left ribs. Has palpable discomfort along the let ant axillary line on the mid to lower ribs. Assessment:   Encounter Diagnoses   Name Primary?  Rib pain on left side Yes    Fall, initial encounter     Viral infection          Plan:   There are no discontinued medications. THE ABOVE NOTED DISCONTINUED MEDS MAY ONLY BE FROM 'CLEANING UP' THE MED LIST AND WERE NOT ACTUALLY CANCELED;  SEE CHART FOR DETAILS!   Orders Placed This Encounter   Medications    diclofenac (VOLTAREN) 75 MG EC tablet     Sig: Take 1 tablet by mouth 2 times daily (with meals)     Dispense:  60 tablet     Refill:  0    cyclobenzaprine (FLEXERIL) 10 MG tablet     Sig: Take 1 tablet by mouth 3 times daily as needed for Muscle spasms     Dispense:  30 tablet     Refill:  0    ketorolac (TORADOL) injection 60 mg     Orders Placed This Encounter   Procedures    XR RIBS LEFT INCLUDE CHEST (MIN 3 VIEWS)     Standing Status:   Future     Standing Expiration Date:   3/14/2023     Order Specific Question:   Reason for exam:     Answer:   See ICDM-10 code attached    COVID-19     Standing Status:   Future     Standing Expiration Date:   3/14/2023     Scheduling Instructions:      1) Due to current limited availability of the COVID-19 test, tests will be prioritized based on responses to questions above. Testing may be delayed due to volume. 2) Print and instruct patient to adhere to CDC home isolation program. (Link Above)              3) Set up or refer patient for a monitoring program.              4) Have patient sign up for and leverage MyChart (if not previously done). Order Specific Question:   Is this test for diagnosis or screening? Answer:   Diagnosis of ill patient     Order Specific Question:   Symptomatic for COVID-19 as defined by CDC? Answer:   Yes     Order Specific Question:   Date of Symptom Onset     Answer:   3/9/2022     Order Specific Question:   Hospitalized for COVID-19? Answer:   No     Order Specific Question:   Admitted to ICU for COVID-19? Answer:   No     Order Specific Question:   Employed in healthcare setting? Answer:   No     Order Specific Question:   Resident in a congregate (group) care setting? Answer:   No     Order Specific Question:   Pregnant? Answer:   No     Order Specific Question:   Previously tested for COVID-19? Answer:   Yes     Return in about 2 weeks (around 3/28/2022). There are no Patient Instructions on file for this visit. Follow up with your provider    Note for off work today and 3/15        Use topcal heat. There are no discontinued medications. THE ABOVE NOTED DISCONTINUED MEDS MAY ONLY BE FROM 'CLEANING UP' THE MED LIST AND WERE NOT ACTUALLY CANCELED;  SEE CHART FOR DETAILS!   Orders Placed This Encounter Medications    diclofenac (VOLTAREN) 75 MG EC tablet     Sig: Take 1 tablet by mouth 2 times daily (with meals)     Dispense:  60 tablet     Refill:  0    cyclobenzaprine (FLEXERIL) 10 MG tablet     Sig: Take 1 tablet by mouth 3 times daily as needed for Muscle spasms     Dispense:  30 tablet     Refill:  0    ketorolac (TORADOL) injection 60 mg     Orders Placed This Encounter   Procedures    XR RIBS LEFT INCLUDE CHEST (MIN 3 VIEWS)     Standing Status:   Future     Standing Expiration Date:   3/14/2023     Order Specific Question:   Reason for exam:     Answer:   See ICDM-10 code attached    COVID-19     Standing Status:   Future     Standing Expiration Date:   3/14/2023     Scheduling Instructions:      1) Due to current limited availability of the COVID-19 test, tests will be prioritized based on responses to questions above. Testing may be delayed due to volume. 2) Print and instruct patient to adhere to CDC home isolation program. (Link Above)              3) Set up or refer patient for a monitoring program.              4) Have patient sign up for and leverage MyChart (if not previously done). Order Specific Question:   Is this test for diagnosis or screening? Answer:   Diagnosis of ill patient     Order Specific Question:   Symptomatic for COVID-19 as defined by CDC? Answer:   Yes     Order Specific Question:   Date of Symptom Onset     Answer:   3/9/2022     Order Specific Question:   Hospitalized for COVID-19? Answer:   No     Order Specific Question:   Admitted to ICU for COVID-19? Answer:   No     Order Specific Question:   Employed in healthcare setting? Answer:   No     Order Specific Question:   Resident in a congregate (group) care setting? Answer:   No     Order Specific Question:   Pregnant? Answer:   No     Order Specific Question:   Previously tested for COVID-19? Answer:   Yes     Return in about 2 weeks (around 3/28/2022).   There are no Patient Instructions on file for this visit. Follow up with your provider    Note for off work today and 3/15        Sx tx. Do not use rib belts.

## 2024-11-16 DIAGNOSIS — F41.9 ANXIETY DISORDER, UNSPECIFIED TYPE: ICD-10-CM

## 2024-11-18 RX ORDER — LORAZEPAM 1 MG/1
1 TABLET ORAL EVERY 8 HOURS PRN
Qty: 270 TABLET | Refills: 0 | OUTPATIENT
Start: 2024-11-18

## 2024-11-18 NOTE — TELEPHONE ENCOUNTER
Request for Controlled Substance      Date of Request: 11/18/2024  Medication: Lorazepam  Last OV: 7/26/2024  Next OV: 1/10/2025  Last UDS: 12/9/2022  Last Narcotic Consent: 12/9/2022

## 2024-11-20 RX ORDER — LORAZEPAM 1 MG/1
1 TABLET ORAL EVERY 8 HOURS PRN
Qty: 270 TABLET | Refills: 0 | Status: SHIPPED | OUTPATIENT
Start: 2024-11-20

## 2024-12-03 DIAGNOSIS — J01.90 ACUTE SINUSITIS, RECURRENCE NOT SPECIFIED, UNSPECIFIED LOCATION: ICD-10-CM

## 2024-12-03 RX ORDER — CHLORCYCLIZINE HYDROCHLORIDE AND PSEUDOEPHEDRINE HYDROCHLORIDE 25; 60 MG/1; MG/1
1 TABLET ORAL 3 TIMES DAILY
Qty: 21 TABLET | Refills: 0 | OUTPATIENT
Start: 2024-12-03

## 2024-12-09 DIAGNOSIS — R19.7 DIARRHEA, UNSPECIFIED TYPE: ICD-10-CM

## 2024-12-09 DIAGNOSIS — G47.00 INSOMNIA, UNSPECIFIED TYPE: ICD-10-CM

## 2024-12-09 DIAGNOSIS — M79.7 FIBROMYALGIA: ICD-10-CM

## 2024-12-09 DIAGNOSIS — G43.909 MIGRAINE WITHOUT STATUS MIGRAINOSUS, NOT INTRACTABLE, UNSPECIFIED MIGRAINE TYPE: ICD-10-CM

## 2024-12-09 DIAGNOSIS — J01.90 ACUTE SINUSITIS, RECURRENCE NOT SPECIFIED, UNSPECIFIED LOCATION: ICD-10-CM

## 2024-12-09 DIAGNOSIS — Z79.890 HORMONE REPLACEMENT THERAPY (HRT): ICD-10-CM

## 2024-12-09 RX ORDER — ZOLPIDEM TARTRATE 10 MG/1
10 TABLET ORAL
Qty: 90 TABLET | Refills: 1 | Status: CANCELLED | OUTPATIENT
Start: 2024-12-09

## 2024-12-09 RX ORDER — CHLORCYCLIZINE HYDROCHLORIDE AND PSEUDOEPHEDRINE HYDROCHLORIDE 25; 60 MG/1; MG/1
1 TABLET ORAL 3 TIMES DAILY
Qty: 21 TABLET | Refills: 0 | Status: SHIPPED | OUTPATIENT
Start: 2024-12-09

## 2024-12-09 NOTE — TELEPHONE ENCOUNTER
Request for Controlled Substance      Date of Request: 12/9/2024  Medication: Ambien , Tramadol  Last OV: 7/26/2024  Next OV: 1/10/25  Last UDS: 12/9/22  Last Narcotic Consent: 10/18/23

## 2024-12-10 RX ORDER — TRAMADOL HYDROCHLORIDE 50 MG/1
50 TABLET ORAL EVERY 8 HOURS PRN
Qty: 270 TABLET | Refills: 0 | Status: SHIPPED | OUTPATIENT
Start: 2024-12-10

## 2024-12-10 RX ORDER — ESTRADIOL 0.05 MG/D
1 PATCH, EXTENDED RELEASE TRANSDERMAL 2 TIMES WEEKLY
Qty: 24 PATCH | Refills: 2 | Status: SHIPPED | OUTPATIENT
Start: 2024-12-12

## 2024-12-11 NOTE — TELEPHONE ENCOUNTER
According to record, she received a 90 day supply of ambien on 11/16/24. She is not quite due for a refill on that yet.

## 2024-12-19 DIAGNOSIS — M79.7 FIBROMYALGIA: ICD-10-CM

## 2024-12-19 DIAGNOSIS — G43.909 MIGRAINE WITHOUT STATUS MIGRAINOSUS, NOT INTRACTABLE, UNSPECIFIED MIGRAINE TYPE: ICD-10-CM

## 2024-12-20 RX ORDER — TRAMADOL HYDROCHLORIDE 50 MG/1
50 TABLET ORAL EVERY 8 HOURS PRN
Qty: 270 TABLET | Refills: 0 | OUTPATIENT
Start: 2024-12-20

## 2025-01-10 ENCOUNTER — OFFICE VISIT (OUTPATIENT)
Dept: INTERNAL MEDICINE | Facility: CLINIC | Age: 50
End: 2025-01-10
Payer: COMMERCIAL

## 2025-01-10 VITALS
WEIGHT: 108.4 LBS | HEART RATE: 95 BPM | OXYGEN SATURATION: 98 % | BODY MASS INDEX: 21.28 KG/M2 | TEMPERATURE: 97.4 F | DIASTOLIC BLOOD PRESSURE: 83 MMHG | HEIGHT: 60 IN | SYSTOLIC BLOOD PRESSURE: 126 MMHG

## 2025-01-10 DIAGNOSIS — J01.90 ACUTE SINUSITIS, RECURRENCE NOT SPECIFIED, UNSPECIFIED LOCATION: ICD-10-CM

## 2025-01-10 DIAGNOSIS — Z79.899 ENCOUNTER FOR LONG-TERM (CURRENT) USE OF MEDICATIONS: ICD-10-CM

## 2025-01-10 DIAGNOSIS — E55.9 VITAMIN D DEFICIENCY: ICD-10-CM

## 2025-01-10 DIAGNOSIS — E03.9 ACQUIRED HYPOTHYROIDISM: ICD-10-CM

## 2025-01-10 DIAGNOSIS — Z00.00 ANNUAL PHYSICAL EXAM: Primary | ICD-10-CM

## 2025-01-10 DIAGNOSIS — Z79.890 HORMONE REPLACEMENT THERAPY (HRT): ICD-10-CM

## 2025-01-10 DIAGNOSIS — R73.9 HYPERGLYCEMIA: ICD-10-CM

## 2025-01-10 DIAGNOSIS — R19.7 DIARRHEA, UNSPECIFIED TYPE: ICD-10-CM

## 2025-01-10 DIAGNOSIS — E78.2 MIXED HYPERLIPIDEMIA: ICD-10-CM

## 2025-01-10 LAB
AMPHET+METHAMPHET UR QL: NEGATIVE
AMPHETAMINE INTERNAL CONTROL: ABNORMAL
AMPHETAMINES UR QL: NEGATIVE
BARBITURATE INTERNAL CONTROL: ABNORMAL
BARBITURATES UR QL SCN: NEGATIVE
BENZODIAZ UR QL SCN: POSITIVE
BENZODIAZEPINE INTERNAL CONTROL: ABNORMAL
BUPRENORPHINE INTERNAL CONTROL: ABNORMAL
BUPRENORPHINE SERPL-MCNC: NEGATIVE NG/ML
CANNABINOIDS SERPL QL: NEGATIVE
COCAINE INTERNAL CONTROL: ABNORMAL
COCAINE UR QL: NEGATIVE
EXPIRATION DATE: 0
Lab: 0
MDMA (ECSTASY) INTERNAL CONTROL: ABNORMAL
MDMA UR QL SCN: NEGATIVE
METHADONE INTERNAL CONTROL: ABNORMAL
METHADONE UR QL SCN: NEGATIVE
METHAMPHETAMINE INTERNAL CONTROL: ABNORMAL
MORPHINE INTERNAL CONTROL: ABNORMAL
MORPHINE/OPIATES SCREEN, URINE: NEGATIVE
OXYCODONE INTERNAL CONTROL: ABNORMAL
OXYCODONE UR QL SCN: NEGATIVE
PCP UR QL SCN: NEGATIVE
PHENCYCLIDINE INTERNAL CONTROL: ABNORMAL
THC INTERNAL CONTROL: ABNORMAL

## 2025-01-10 PROCEDURE — 80305 DRUG TEST PRSMV DIR OPT OBS: CPT | Performed by: INTERNAL MEDICINE

## 2025-01-10 PROCEDURE — 99396 PREV VISIT EST AGE 40-64: CPT | Performed by: INTERNAL MEDICINE

## 2025-01-10 RX ORDER — ESTRADIOL 0.05 MG/D
1 PATCH, EXTENDED RELEASE TRANSDERMAL 2 TIMES WEEKLY
Qty: 24 PATCH | Refills: 2 | Status: CANCELLED | OUTPATIENT
Start: 2025-01-13

## 2025-01-10 NOTE — PROGRESS NOTES
Chief Complaint  Hypertension, Hypothyroidism, Med Refill (Wants to change dosage of the estradiol patches 0.1 /), and nasal drainage (She currently does the stahist and wants to know if this is okay to continue to do )    Subjective          Susan Bustos presents to [facility]  History of Present Illness  Hypertension- patient reports feeling well. Patient denies headaches, dizziness, chest pain.   Hypothyroid- due for recheck  Patient would be interested in higher dosage of estrogen to help control menopausal symptoms.   Patient is taking stahist for nasal drainage.   Anxiety- ativan is helpful and patient would like to continue to use as needed intermittently.   Chronic pain. Patient sparingly uses tramadol prn. But it is helpful at with allowing patient to stay active.      Current Outpatient Medications   Medication Instructions    albuterol sulfate  (90 Base) MCG/ACT inhaler 2 puffs, Inhalation, Every 4 Hours PRN    aspirin 81 mg, Daily    Azelastine-Fluticasone 137-50 MCG/ACT suspension 1 spray, Nasal, Daily    benzonatate (TESSALON PERLES) 100 mg, Oral, 3 Times Daily PRN    Chlorcyclizine-Pseudoephed (Stahist AD) 25-60 MG tablet 1 tablet, Oral, 3 Times Daily    estradiol (Minivelle) 0.1 MG/24HR patch 1 patch, Transdermal, 2 Times Weekly    hyoscyamine (LEVSIN) 0.125 mg, Oral, Every 4 Hours PRN    LORazepam (ATIVAN) 1 mg, Oral, Every 8 Hours PRN, for anxiety    multivitamin with minerals (MULTIVITAMIN ADULTS 50+ PO) 1 tablet, Daily    pantoprazole (PROTONIX) 40 mg, Oral, Daily    Qulipta 60 mg, Oral, Daily    topiramate (TOPAMAX) 50 mg, Oral, 2 Times Daily    traMADol (ULTRAM) 50 mg, Oral, Every 8 Hours PRN    Ubrelvy 100 mg, Oral, See Admin Instructions    zolpidem (AMBIEN) 10 mg, Oral, Every Night at Bedtime       The following portions of the patient's history were reviewed and updated as appropriate: allergies, current medications, past family  "history, past medical history, past social history, past surgical history, and problem list.    Objective   Vital Signs:   /83 (BP Location: Left arm, Patient Position: Sitting, Cuff Size: Adult)   Pulse 95   Temp 97.4 °F (36.3 °C) (Temporal)   Ht 152.4 cm (60\")   Wt 49.2 kg (108 lb 6.4 oz)   SpO2 98%   BMI 21.17 kg/m²     BP Readings from Last 3 Encounters:   01/10/25 126/83   10/09/24 100/69   07/26/24 145/87     Wt Readings from Last 3 Encounters:   01/10/25 49.2 kg (108 lb 6.4 oz)   10/09/24 48.8 kg (107 lb 9.4 oz)   07/26/24 50.6 kg (111 lb 9.6 oz)     BMI is within normal parameters. No other follow-up for BMI required.     Physical Exam   Appearance: No acute distress, well-nourished  Head: normocephalic, atraumatic  Eyes: extraocular movements intact, no scleral icterus, no conjunctival injection  Ears, Nose, and Throat: external ears normal, nares patent, moist mucous membranes  Cardiovascular: regular rate and rhythm. no murmurs, rubs, or gallops. no edema  Respiratory: breathing comfortably, symmetric chest rise, clear to auscultation bilaterally. No wheezes, rales, or rhonchi.  Neuro: alert and oriented to time, place, and person. Normal gait  Psych: normal mood and affect       Result Review :   The following data was reviewed by: Flaco Lang Jr, MD on 01/10/2025:      Lab Results   Component Value Date    BILIRUBINUR Negative 04/05/2022       Last Urine Toxicity  More data exists         Latest Ref Rng & Units 1/10/2025 12/9/2022   LAST URINE TOXICITY RESULTS   Amphetamine, Urine Qual Negative Negative  Negative    Barbiturates Screen, Urine Negative Negative  Negative    Benzodiazepine Screen, Urine Negative Positive  Positive    Buprenorphine, Screen, Urine Negative Negative  Negative    Cocaine Screen, Urine Negative Negative  Negative    Methadone Screen , Urine Negative Negative  Negative    Methamphetamine, Ur Negative Negative  Negative       Details             "       Assessment and Plan    Diagnoses and all orders for this visit:    1. Annual physical exam (Primary)  -     CBC & Differential; Future  -     Comprehensive Metabolic Panel; Future    2. Acquired hypothyroidism  -     TSH; Future    3. Vitamin D deficiency  -     Vitamin D,25-Hydroxy; Future    4. Mixed hyperlipidemia  -     Lipid Panel; Future    5. Hyperglycemia  -     Hemoglobin A1c; Future    6. Encounter for long-term (current) use of medications  -     POC Medline 12 Panel Urine Drug Screen    Other orders  -     pantoprazole (PROTONIX) 40 MG EC tablet; Take 1 tablet by mouth Daily.  Dispense: 90 tablet; Refill: 3    Advised on diet, physical activity, etc      Medications Discontinued During This Encounter   Medication Reason    pantoprazole (PROTONIX) 40 MG EC tablet *Error        Follow Up   Return in about 6 months (around 7/10/2025).  Patient was given instructions and counseling regarding her condition or for health maintenance advice. Please see specific information pulled into the AVS if appropriate.       Flaco Lang Jr, MD  01/26/25  12:52 EST

## 2025-01-13 ENCOUNTER — PATIENT MESSAGE (OUTPATIENT)
Dept: INTERNAL MEDICINE | Facility: CLINIC | Age: 50
End: 2025-01-13
Payer: COMMERCIAL

## 2025-01-13 DIAGNOSIS — R19.7 DIARRHEA, UNSPECIFIED TYPE: ICD-10-CM

## 2025-01-13 RX ORDER — PANTOPRAZOLE SODIUM 40 MG/1
40 TABLET, DELAYED RELEASE ORAL DAILY
Qty: 90 TABLET | Refills: 0 | Status: SHIPPED | OUTPATIENT
Start: 2025-01-13

## 2025-01-13 NOTE — TELEPHONE ENCOUNTER
Pt is requesting dosage change.        estradiol (MINIVELLE, VIVELLE-DOT) 0.05 MG/24HR patch       Patient Comment: Please change doseto .1mg as we discussed today

## 2025-01-13 NOTE — TELEPHONE ENCOUNTER
Please call and let patient know I did send in her Protonix refill but if she continues to need and is unable to wean off She should have an EGD. She can try to wean by trying QOD then q3rd day and gradually stop taking

## 2025-01-13 NOTE — TELEPHONE ENCOUNTER
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  She will need to obtain the refill from her current PCP (Dr. Lang)

## 2025-01-14 DIAGNOSIS — R19.7 DIARRHEA, UNSPECIFIED TYPE: ICD-10-CM

## 2025-01-14 DIAGNOSIS — J01.90 ACUTE SINUSITIS, RECURRENCE NOT SPECIFIED, UNSPECIFIED LOCATION: ICD-10-CM

## 2025-01-14 RX ORDER — ESTRADIOL 0.1 MG/D
1 FILM, EXTENDED RELEASE TRANSDERMAL 2 TIMES WEEKLY
Qty: 24 PATCH | Refills: 3 | Status: SHIPPED | OUTPATIENT
Start: 2025-01-16

## 2025-01-14 RX ORDER — BENZONATATE 100 MG/1
100 CAPSULE ORAL 3 TIMES DAILY PRN
Qty: 60 CAPSULE | Refills: 0 | Status: SHIPPED | OUTPATIENT
Start: 2025-01-14

## 2025-01-14 RX ORDER — CHLORCYCLIZINE HYDROCHLORIDE AND PSEUDOEPHEDRINE HYDROCHLORIDE 25; 60 MG/1; MG/1
1 TABLET ORAL 3 TIMES DAILY
Qty: 21 TABLET | Refills: 0 | OUTPATIENT
Start: 2025-01-14

## 2025-01-14 RX ORDER — CHLORCYCLIZINE HYDROCHLORIDE AND PSEUDOEPHEDRINE HYDROCHLORIDE 25; 60 MG/1; MG/1
1 TABLET ORAL 3 TIMES DAILY
Qty: 21 TABLET | Refills: 0 | Status: SHIPPED | OUTPATIENT
Start: 2025-01-14

## 2025-01-15 DIAGNOSIS — G43.909 MIGRAINE WITHOUT STATUS MIGRAINOSUS, NOT INTRACTABLE, UNSPECIFIED MIGRAINE TYPE: ICD-10-CM

## 2025-01-15 RX ORDER — TOPIRAMATE 50 MG/1
50 TABLET, FILM COATED ORAL 2 TIMES DAILY
Qty: 180 TABLET | Refills: 1 | Status: SHIPPED | OUTPATIENT
Start: 2025-01-15

## 2025-01-15 NOTE — TELEPHONE ENCOUNTER
S/w pt, she is agreeable to gradually wean off of the Protonix. She will let us know if globus sensation returns after weaning off.

## 2025-01-26 RX ORDER — PANTOPRAZOLE SODIUM 40 MG/1
40 TABLET, DELAYED RELEASE ORAL DAILY
Qty: 90 TABLET | Refills: 3 | Status: SHIPPED | OUTPATIENT
Start: 2025-01-26

## 2025-02-14 DIAGNOSIS — G47.00 INSOMNIA, UNSPECIFIED TYPE: ICD-10-CM

## 2025-02-14 DIAGNOSIS — J01.90 ACUTE SINUSITIS, RECURRENCE NOT SPECIFIED, UNSPECIFIED LOCATION: ICD-10-CM

## 2025-02-15 DIAGNOSIS — J01.90 ACUTE SINUSITIS, RECURRENCE NOT SPECIFIED, UNSPECIFIED LOCATION: ICD-10-CM

## 2025-02-17 RX ORDER — CHLORCYCLIZINE HYDROCHLORIDE AND PSEUDOEPHEDRINE HYDROCHLORIDE 25; 60 MG/1; MG/1
1 TABLET ORAL 3 TIMES DAILY PRN
Qty: 90 TABLET | Refills: 3 | Status: SHIPPED | OUTPATIENT
Start: 2025-02-17

## 2025-02-17 RX ORDER — CHLORCYCLIZINE HYDROCHLORIDE AND PSEUDOEPHEDRINE HYDROCHLORIDE 25; 60 MG/1; MG/1
1 TABLET ORAL 3 TIMES DAILY
Qty: 30 TABLET | OUTPATIENT
Start: 2025-02-17

## 2025-02-17 RX ORDER — ZOLPIDEM TARTRATE 10 MG/1
10 TABLET ORAL
Qty: 90 TABLET | Refills: 1 | OUTPATIENT
Start: 2025-02-17

## 2025-02-17 RX ORDER — ZOLPIDEM TARTRATE 10 MG/1
10 TABLET ORAL
Qty: 90 TABLET | Refills: 0 | Status: SHIPPED | OUTPATIENT
Start: 2025-02-17

## 2025-02-17 NOTE — TELEPHONE ENCOUNTER
Refill Request for Controlled Substance    Date of Request: 2/17/2025  Medication Requested: AMBIEN  Last UDS: 01/10/2025  Last Consent: 10/18/2023  Last OV: 01/10/2025  Next OV: 07/11/2025

## 2025-03-04 ENCOUNTER — PATIENT MESSAGE (OUTPATIENT)
Dept: INTERNAL MEDICINE | Facility: CLINIC | Age: 50
End: 2025-03-04
Payer: COMMERCIAL

## 2025-03-04 DIAGNOSIS — G43.909 MIGRAINE WITHOUT STATUS MIGRAINOSUS, NOT INTRACTABLE, UNSPECIFIED MIGRAINE TYPE: ICD-10-CM

## 2025-03-04 RX ORDER — ATOGEPANT 60 MG/1
60 TABLET ORAL DAILY
Qty: 90 TABLET | Refills: 3 | Status: SHIPPED | OUTPATIENT
Start: 2025-03-04

## 2025-03-05 DIAGNOSIS — F41.9 ANXIETY DISORDER, UNSPECIFIED TYPE: ICD-10-CM

## 2025-03-05 RX ORDER — LORAZEPAM 1 MG/1
1 TABLET ORAL EVERY 8 HOURS PRN
Qty: 90 TABLET | Refills: 0 | Status: SHIPPED | OUTPATIENT
Start: 2025-03-05

## 2025-03-05 NOTE — TELEPHONE ENCOUNTER
Refill Request for Controlled Substance    Date of Request: 3/5/2025  Medication Requested: Ativan   Last UDS: 01/10/2025  Last Consent: 10/18/2023  Last OV: 01/10/2025  Next OV: 07/11/2025

## 2025-03-27 DIAGNOSIS — G43.909 MIGRAINE WITHOUT STATUS MIGRAINOSUS, NOT INTRACTABLE, UNSPECIFIED MIGRAINE TYPE: ICD-10-CM

## 2025-03-27 DIAGNOSIS — M79.7 FIBROMYALGIA: ICD-10-CM

## 2025-03-27 RX ORDER — TRAMADOL HYDROCHLORIDE 50 MG/1
50 TABLET ORAL EVERY 8 HOURS PRN
Qty: 270 TABLET | Refills: 0 | Status: SHIPPED | OUTPATIENT
Start: 2025-03-27

## 2025-03-27 RX ORDER — TRAMADOL HYDROCHLORIDE 50 MG/1
50 TABLET ORAL EVERY 8 HOURS PRN
Qty: 270 TABLET | Refills: 0 | OUTPATIENT
Start: 2025-03-27

## 2025-03-27 NOTE — TELEPHONE ENCOUNTER
Refill Request for Controlled Substance    Date of Request: 3/27/2025  Medication Requested: Tramadol  Last UDS: 01/10/2025  Last Consent: 10/18/2023  Last OV: 01/10/2025  Next OV: 07/11/2025

## 2025-04-01 ENCOUNTER — LAB REQUISITION (OUTPATIENT)
Dept: LAB | Facility: HOSPITAL | Age: 50
End: 2025-04-01
Payer: COMMERCIAL

## 2025-04-01 DIAGNOSIS — R30.0 DYSURIA: ICD-10-CM

## 2025-04-01 PROCEDURE — 87147 CULTURE TYPE IMMUNOLOGIC: CPT | Performed by: OBSTETRICS & GYNECOLOGY

## 2025-04-01 PROCEDURE — 87086 URINE CULTURE/COLONY COUNT: CPT | Performed by: OBSTETRICS & GYNECOLOGY

## 2025-04-02 LAB — BACTERIA SPEC AEROBE CULT: ABNORMAL

## 2025-05-02 ENCOUNTER — PATIENT MESSAGE (OUTPATIENT)
Dept: INTERNAL MEDICINE | Facility: CLINIC | Age: 50
End: 2025-05-02
Payer: COMMERCIAL

## 2025-05-12 ENCOUNTER — PRIOR AUTHORIZATION (OUTPATIENT)
Dept: INTERNAL MEDICINE | Facility: CLINIC | Age: 50
End: 2025-05-12
Payer: COMMERCIAL

## 2025-05-12 NOTE — TELEPHONE ENCOUNTER
primo palumbo (Key: [id number])    Drug  Qulipta 60MG tablets    Additional Information Required  The member recently filled this medication and will be able to return for their next refill according to their plan limits.

## 2025-05-16 ENCOUNTER — LAB REQUISITION (OUTPATIENT)
Dept: LAB | Facility: HOSPITAL | Age: 50
End: 2025-05-16
Payer: COMMERCIAL

## 2025-05-16 DIAGNOSIS — F41.9 ANXIETY DISORDER, UNSPECIFIED TYPE: ICD-10-CM

## 2025-05-16 DIAGNOSIS — G47.00 INSOMNIA, UNSPECIFIED TYPE: ICD-10-CM

## 2025-05-16 DIAGNOSIS — R30.0 DYSURIA: ICD-10-CM

## 2025-05-16 PROCEDURE — 87186 SC STD MICRODIL/AGAR DIL: CPT | Performed by: OBSTETRICS & GYNECOLOGY

## 2025-05-16 PROCEDURE — 87086 URINE CULTURE/COLONY COUNT: CPT | Performed by: OBSTETRICS & GYNECOLOGY

## 2025-05-16 PROCEDURE — 87077 CULTURE AEROBIC IDENTIFY: CPT | Performed by: OBSTETRICS & GYNECOLOGY

## 2025-05-16 RX ORDER — ZOLPIDEM TARTRATE 10 MG/1
10 TABLET ORAL
Qty: 90 TABLET | Refills: 0 | OUTPATIENT
Start: 2025-05-16

## 2025-05-16 RX ORDER — ZOLPIDEM TARTRATE 10 MG/1
10 TABLET ORAL
Qty: 90 TABLET | Refills: 0 | Status: SHIPPED | OUTPATIENT
Start: 2025-05-16

## 2025-05-16 RX ORDER — LORAZEPAM 1 MG/1
1 TABLET ORAL EVERY 8 HOURS PRN
Qty: 90 TABLET | Refills: 0 | OUTPATIENT
Start: 2025-05-16

## 2025-05-16 RX ORDER — LORAZEPAM 1 MG/1
1 TABLET ORAL EVERY 8 HOURS PRN
Qty: 90 TABLET | Refills: 0 | Status: SHIPPED | OUTPATIENT
Start: 2025-05-16

## 2025-05-18 LAB — BACTERIA SPEC AEROBE CULT: ABNORMAL

## 2025-05-22 LAB — BACTERIA SPEC AEROBE CULT: ABNORMAL

## 2025-06-05 ENCOUNTER — LAB REQUISITION (OUTPATIENT)
Dept: LAB | Facility: HOSPITAL | Age: 50
End: 2025-06-05
Payer: COMMERCIAL

## 2025-06-05 DIAGNOSIS — R30.0 DYSURIA: ICD-10-CM

## 2025-06-05 PROCEDURE — 87086 URINE CULTURE/COLONY COUNT: CPT | Performed by: OBSTETRICS & GYNECOLOGY

## 2025-06-07 LAB — BACTERIA SPEC AEROBE CULT: NORMAL

## 2025-07-09 NOTE — PROGRESS NOTES
Chief Complaint  Hypertension (Follow up ) and medication follow up  (Getting the prolia shipped here /)    Subjective          Susan Bustos presents to [facility]  History of Present Illness  The patient received a single injection of denosumab (Prolia) in 2023 and expresses interest in receiving another dose. The patient has recently resumed vitamin D supplementation based on recent labs showing lower value.  Patient is due for DEXA scan again this year.    Patient reports her anxiety is well-controlled with use of Ativan.  She reports doing well at work.  She is sleeping well.  She does report that she hopes to retire soon and this will help lower her anxiety.    Patient reports tramadol does control her chronic pain.  She reports being more functional with use of tramadol.    Patient reports her migraines are controlled with use of Qulipta and Ubrelvy as needed.    She routinely receives the influenza vaccine at the hospital.      Current Outpatient Medications   Medication Instructions    albuterol sulfate  (90 Base) MCG/ACT inhaler 2 puffs, Inhalation, Every 4 Hours PRN    aspirin 81 mg, Daily    Azelastine-Fluticasone 137-50 MCG/ACT suspension 1 spray, Nasal, Daily    Chlorcyclizine-Pseudoephed (Stahist AD) 25-60 MG tablet 1 tablet, Oral, 3 Times Daily PRN    estradiol (Minivelle) 0.1 MG/24HR patch 1 patch, Transdermal, 2 Times Weekly    hyoscyamine (LEVSIN) 0.125 mg, Oral, Every 4 Hours PRN    LORazepam (ATIVAN) 1 mg, Oral, Every 8 Hours PRN, for anxiety    multivitamin with minerals (MULTIVITAMIN ADULTS 50+ PO) 1 tablet, Daily    Qulipta 60 mg, Oral, Daily    topiramate (TOPAMAX) 50 mg, Oral, 2 Times Daily    traMADol (ULTRAM) 50 mg, Oral, Every 8 Hours PRN    Ubrelvy 100 mg, Oral, See Admin Instructions    zolpidem (AMBIEN) 10 mg, Oral, Every Night at Bedtime       The following portions of the patient's history were reviewed and updated as  "appropriate: allergies, current medications, past family history, past medical history, past social history, past surgical history, and problem list.    Objective   Vital Signs:   /79 (BP Location: Left arm, Patient Position: Sitting, Cuff Size: Adult)   Pulse 103   Temp 98.7 °F (37.1 °C) (Temporal)   Ht 152.4 cm (60\")   Wt 48.9 kg (107 lb 12.8 oz)   SpO2 98%   BMI 21.05 kg/m²     BP Readings from Last 3 Encounters:   07/11/25 120/79   01/10/25 126/83   10/09/24 100/69     Wt Readings from Last 3 Encounters:   07/11/25 48.9 kg (107 lb 12.8 oz)   01/10/25 49.2 kg (108 lb 6.4 oz)   10/09/24 48.8 kg (107 lb 9.4 oz)     Pulse Readings from Last 3 Encounters:   07/11/25 103   01/10/25 95   10/09/24 78     Physical Exam   Appearance: No acute distress, well-nourished  Head: normocephalic, atraumatic  Eyes: extraocular movements intact, no scleral icterus, no conjunctival injection  Ears, Nose, and Throat: external ears normal, nares patent, moist mucous membranes  Cardiovascular: regular rate and rhythm. no murmurs, rubs, or gallops. no edema  Respiratory: breathing comfortably, symmetric chest rise, clear to auscultation bilaterally. No wheezes, rales, or rhonchi.  Neuro: alert and oriented to time, place, and person. Normal gait  Psych: normal mood and affect     Result Review :   The following data was reviewed by: Flaco Lang Jr, MD on 07/11/2025:           Lab Results   Component Value Date    BILIRUBINUR Negative 04/05/2022       Last Urine Toxicity  More data exists         Latest Ref Rng & Units 7/11/2025 1/10/2025   LAST URINE TOXICITY RESULTS   Amphetamine, Urine Qual Negative Negative  Negative    Barbiturates Screen, Urine Negative Negative  Negative    Benzodiazepine Screen, Urine Negative Positive  Positive    Buprenorphine, Screen, Urine Negative Negative  Negative    Cocaine Screen, Urine Negative Negative  Negative    Methadone Screen , Urine Negative Negative  Negative  "   Methamphetamine, Ur Negative Negative  Negative        Assessment and Plan    Diagnoses and all orders for this visit:    1. Generalized anxiety disorder (Primary)  Comments:  Doing well on current regimen.  UDS and Sukhdeep reviewed.    2. Encounter for long-term (current) use of medications  -     POC Medline 12 Panel Urine Drug Screen    3. Mixed hyperlipidemia  Comments:  Lipid reviewed with the patient.    4. Acquired hypothyroidism  Comments:  Most TSH within normal limits.    5. Vitamin D deficiency  Comments:  Patient resume vitamin D supplementation based on most recent labs on lower value.    6. Migraine without aura and without status migrainosus, not intractable  Comments:  Continue Quillipta and Ubrelvy.    7. Need for pneumococcal vaccination    8. Breast cancer screening by mammogram  -     Mammo Screening Digital Tomosynthesis Bilateral With CAD; Future    9. Age-related osteoporosis without current pathological fracture  Comments:  Work to obtain Prolia injections.  Due for DEXA.  Orders:  -     DEXA Bone Density Axial; Future          Medications Discontinued During This Encounter   Medication Reason    benzonatate (Tessalon Perles) 100 MG capsule *Therapy completed    pantoprazole (PROTONIX) 40 MG EC tablet *Therapy completed          Follow Up   Return in about 6 months (around 1/11/2026).  Patient was given instructions and counseling regarding her condition or for health maintenance advice. Please see specific information pulled into the AVS if appropriate.       Flaco Lang Jr, MD  07/11/25  10:22 EDT

## 2025-07-11 ENCOUNTER — OFFICE VISIT (OUTPATIENT)
Dept: INTERNAL MEDICINE | Facility: CLINIC | Age: 50
End: 2025-07-11
Payer: COMMERCIAL

## 2025-07-11 VITALS
BODY MASS INDEX: 21.17 KG/M2 | SYSTOLIC BLOOD PRESSURE: 120 MMHG | OXYGEN SATURATION: 98 % | DIASTOLIC BLOOD PRESSURE: 79 MMHG | HEIGHT: 60 IN | TEMPERATURE: 98.7 F | HEART RATE: 103 BPM | WEIGHT: 107.8 LBS

## 2025-07-11 DIAGNOSIS — E55.9 VITAMIN D DEFICIENCY: ICD-10-CM

## 2025-07-11 DIAGNOSIS — E78.2 MIXED HYPERLIPIDEMIA: ICD-10-CM

## 2025-07-11 DIAGNOSIS — M81.0 AGE-RELATED OSTEOPOROSIS WITHOUT CURRENT PATHOLOGICAL FRACTURE: Primary | ICD-10-CM

## 2025-07-11 DIAGNOSIS — Z79.899 ENCOUNTER FOR LONG-TERM (CURRENT) USE OF MEDICATIONS: ICD-10-CM

## 2025-07-11 DIAGNOSIS — G43.009 MIGRAINE WITHOUT AURA AND WITHOUT STATUS MIGRAINOSUS, NOT INTRACTABLE: ICD-10-CM

## 2025-07-11 DIAGNOSIS — M81.0 AGE-RELATED OSTEOPOROSIS WITHOUT CURRENT PATHOLOGICAL FRACTURE: ICD-10-CM

## 2025-07-11 DIAGNOSIS — F41.1 GENERALIZED ANXIETY DISORDER: Primary | ICD-10-CM

## 2025-07-11 DIAGNOSIS — Z23 NEED FOR PNEUMOCOCCAL VACCINATION: ICD-10-CM

## 2025-07-11 DIAGNOSIS — Z12.31 BREAST CANCER SCREENING BY MAMMOGRAM: ICD-10-CM

## 2025-07-11 DIAGNOSIS — E03.9 ACQUIRED HYPOTHYROIDISM: ICD-10-CM

## 2025-07-12 DIAGNOSIS — M79.7 FIBROMYALGIA: ICD-10-CM

## 2025-07-12 DIAGNOSIS — G43.909 MIGRAINE WITHOUT STATUS MIGRAINOSUS, NOT INTRACTABLE, UNSPECIFIED MIGRAINE TYPE: ICD-10-CM

## 2025-07-14 ENCOUNTER — TELEPHONE (OUTPATIENT)
Dept: INTERNAL MEDICINE | Facility: CLINIC | Age: 50
End: 2025-07-14
Payer: COMMERCIAL

## 2025-07-14 NOTE — TELEPHONE ENCOUNTER
Refill Request for Controlled Substance    Date of Request: 7/14/2025  Medication Requested: Tramadol  Last UDS: 07/11/2025  Last Consent: 10/18/2023  Last OV: 07/11/2025  Next OV: 01/16/2026

## 2025-07-14 NOTE — TELEPHONE ENCOUNTER
Called pharmacy [facility] doesn't have the prolia it was sent else where.     They gave me the number which it was [facility]   [phone]    Spoke with pharmacist and it was needing clarification on the direction.   Which was every 6 months.     She also told me that it need a a PA and they will be sending us over that by fax. And it will be sent to us after that.

## 2025-07-15 RX ORDER — TRAMADOL HYDROCHLORIDE 50 MG/1
50 TABLET ORAL EVERY 8 HOURS PRN
Qty: 270 TABLET | Refills: 0 | Status: SHIPPED | OUTPATIENT
Start: 2025-07-15

## 2025-08-05 ENCOUNTER — TELEPHONE (OUTPATIENT)
Dept: INTERNAL MEDICINE | Facility: CLINIC | Age: 50
End: 2025-08-05
Payer: COMMERCIAL

## 2025-08-06 ENCOUNTER — PRIOR AUTHORIZATION (OUTPATIENT)
Dept: INTERNAL MEDICINE | Facility: CLINIC | Age: 50
End: 2025-08-06
Payer: COMMERCIAL

## 2025-08-10 DIAGNOSIS — G43.909 MIGRAINE WITHOUT STATUS MIGRAINOSUS, NOT INTRACTABLE, UNSPECIFIED MIGRAINE TYPE: ICD-10-CM

## 2025-08-11 RX ORDER — TOPIRAMATE 50 MG/1
50 TABLET, FILM COATED ORAL 2 TIMES DAILY
Qty: 180 TABLET | Refills: 1 | Status: SHIPPED | OUTPATIENT
Start: 2025-08-11

## 2025-08-25 DIAGNOSIS — G47.00 INSOMNIA, UNSPECIFIED TYPE: ICD-10-CM

## 2025-08-25 DIAGNOSIS — F41.9 ANXIETY DISORDER, UNSPECIFIED TYPE: ICD-10-CM

## 2025-08-26 RX ORDER — LORAZEPAM 1 MG/1
1 TABLET ORAL EVERY 8 HOURS PRN
Qty: 90 TABLET | Refills: 0 | Status: SHIPPED | OUTPATIENT
Start: 2025-08-26

## 2025-08-26 RX ORDER — ZOLPIDEM TARTRATE 10 MG/1
10 TABLET ORAL
Qty: 90 TABLET | Refills: 0 | Status: SHIPPED | OUTPATIENT
Start: 2025-08-26

## (undated) DEVICE — SOL IRRG H2O PL/BG 1000ML STRL

## (undated) DEVICE — SOLIDIFIER LIQLOC PLS 1500CC BT

## (undated) DEVICE — Device

## (undated) DEVICE — Device: Brand: DEFENDO AIR/WATER/SUCTION AND BIOPSY VALVE